# Patient Record
Sex: FEMALE | Race: BLACK OR AFRICAN AMERICAN | Employment: UNEMPLOYED | URBAN - METROPOLITAN AREA
[De-identification: names, ages, dates, MRNs, and addresses within clinical notes are randomized per-mention and may not be internally consistent; named-entity substitution may affect disease eponyms.]

---

## 2017-01-25 DIAGNOSIS — N20.1 BILATERAL URETERAL CALCULI: Primary | ICD-10-CM

## 2017-03-09 ENCOUNTER — HOSPITAL ENCOUNTER (OUTPATIENT)
Age: 32
Discharge: HOME | End: 2017-03-09
Payer: MEDICAID

## 2017-03-09 ENCOUNTER — HOSPITAL ENCOUNTER (OUTPATIENT)
Dept: GENERAL RADIOLOGY | Age: 32
Discharge: HOME | End: 2017-03-09
Payer: MEDICAID

## 2017-03-09 DIAGNOSIS — N20.1 BILATERAL URETERAL CALCULI: ICD-10-CM

## 2017-03-09 PROCEDURE — 74000 XR ABDOMEN LIMITED (KUB): CPT

## 2017-03-22 ENCOUNTER — TELEPHONE (OUTPATIENT)
Dept: UROLOGY | Age: 32
End: 2017-03-22

## 2017-03-23 ENCOUNTER — OFFICE VISIT (OUTPATIENT)
Dept: UROLOGY | Age: 32
End: 2017-03-23
Payer: MEDICAID

## 2017-03-23 VITALS
SYSTOLIC BLOOD PRESSURE: 135 MMHG | DIASTOLIC BLOOD PRESSURE: 96 MMHG | WEIGHT: 170 LBS | HEART RATE: 89 BPM | BODY MASS INDEX: 31.28 KG/M2 | HEIGHT: 62 IN

## 2017-03-23 DIAGNOSIS — N20.0 KIDNEY STONE ON LEFT SIDE: ICD-10-CM

## 2017-03-23 DIAGNOSIS — N39.0 URINARY TRACT INFECTION WITHOUT HEMATURIA, SITE UNSPECIFIED: ICD-10-CM

## 2017-03-23 DIAGNOSIS — N39.46 MIXED INCONTINENCE: Primary | ICD-10-CM

## 2017-03-23 LAB
BILIRUBIN, POC: NORMAL
BLOOD URINE, POC: NORMAL
CLARITY, POC: NORMAL
COLOR, POC: YELLOW
GLUCOSE URINE, POC: NORMAL
KETONES, POC: NORMAL
LEUKOCYTE EST, POC: NORMAL
NITRITE, POC: NORMAL
PH, POC: 7.5
POST VOID RESIDUAL (PVR): 156 ML
PROTEIN, POC: NORMAL
SPECIFIC GRAVITY, POC: 1.01
UROBILINOGEN, POC: 0.2

## 2017-03-23 PROCEDURE — 51798 US URINE CAPACITY MEASURE: CPT | Performed by: SPECIALIST

## 2017-03-23 PROCEDURE — 99214 OFFICE O/P EST MOD 30 MIN: CPT | Performed by: SPECIALIST

## 2017-03-23 PROCEDURE — 81002 URINALYSIS NONAUTO W/O SCOPE: CPT | Performed by: SPECIALIST

## 2017-03-23 RX ORDER — OXYBUTYNIN CHLORIDE 5 MG/1
5 TABLET, EXTENDED RELEASE ORAL DAILY
Qty: 30 TABLET | Refills: 3 | Status: SHIPPED | OUTPATIENT
Start: 2017-03-23 | End: 2017-08-03 | Stop reason: ALTCHOICE

## 2017-03-23 RX ORDER — NITROFURANTOIN 25; 75 MG/1; MG/1
100 CAPSULE ORAL 2 TIMES DAILY
Qty: 14 CAPSULE | Refills: 0 | Status: SHIPPED | OUTPATIENT
Start: 2017-03-23 | End: 2017-03-30

## 2017-03-29 ENCOUNTER — TELEPHONE (OUTPATIENT)
Dept: UROLOGY | Age: 32
End: 2017-03-29

## 2017-08-03 ENCOUNTER — OFFICE VISIT (OUTPATIENT)
Dept: UROLOGY | Age: 32
End: 2017-08-03
Payer: MEDICAID

## 2017-08-03 VITALS
WEIGHT: 167 LBS | DIASTOLIC BLOOD PRESSURE: 99 MMHG | HEART RATE: 76 BPM | SYSTOLIC BLOOD PRESSURE: 140 MMHG | HEIGHT: 62 IN | BODY MASS INDEX: 30.73 KG/M2

## 2017-08-03 DIAGNOSIS — N20.0 KIDNEY STONE ON LEFT SIDE: ICD-10-CM

## 2017-08-03 DIAGNOSIS — N39.46 MIXED INCONTINENCE: Primary | ICD-10-CM

## 2017-08-03 LAB
BILIRUBIN, POC: NEGATIVE
BLOOD URINE, POC: NORMAL
CLARITY, POC: NORMAL
COLOR, POC: YELLOW
GLUCOSE URINE, POC: NEGATIVE
KETONES, POC: NEGATIVE
LEUKOCYTE EST, POC: NORMAL
NITRITE, POC: NEGATIVE
PH, POC: 6
PROTEIN, POC: NORMAL
SPECIFIC GRAVITY, POC: 1.01
UROBILINOGEN, POC: 0.2

## 2017-08-03 PROCEDURE — 99213 OFFICE O/P EST LOW 20 MIN: CPT | Performed by: SPECIALIST

## 2017-08-03 PROCEDURE — 81003 URINALYSIS AUTO W/O SCOPE: CPT | Performed by: SPECIALIST

## 2017-08-03 RX ORDER — OXYBUTYNIN CHLORIDE 10 MG/1
10 TABLET, EXTENDED RELEASE ORAL DAILY
Qty: 90 TABLET | Refills: 3 | Status: SHIPPED | OUTPATIENT
Start: 2017-08-03 | End: 2018-09-27 | Stop reason: SDUPTHER

## 2018-03-22 ENCOUNTER — OFFICE VISIT (OUTPATIENT)
Dept: UROLOGY | Age: 33
End: 2018-03-22
Payer: MEDICARE

## 2018-03-22 VITALS
HEIGHT: 60 IN | WEIGHT: 174 LBS | SYSTOLIC BLOOD PRESSURE: 129 MMHG | BODY MASS INDEX: 34.16 KG/M2 | TEMPERATURE: 98 F | HEART RATE: 91 BPM | DIASTOLIC BLOOD PRESSURE: 84 MMHG

## 2018-03-22 DIAGNOSIS — N39.3 STRESS INCONTINENCE: ICD-10-CM

## 2018-03-22 DIAGNOSIS — N20.0 KIDNEY STONE ON LEFT SIDE: ICD-10-CM

## 2018-03-22 DIAGNOSIS — R35.0 FREQUENCY OF URINATION: Primary | ICD-10-CM

## 2018-03-22 LAB
APPEARANCE FLUID: ABNORMAL
BILIRUBIN, POC: ABNORMAL
BLOOD URINE, POC: ABNORMAL
CLARITY, POC: CLEAR
COLOR, POC: YELLOW
GLUCOSE URINE, POC: ABNORMAL
KETONES, POC: ABNORMAL
LEUKOCYTE EST, POC: ABNORMAL
NITRITE, POC: ABNORMAL
PH, POC: 7.5
PROTEIN, POC: ABNORMAL
SPECIFIC GRAVITY, POC: 1.02
UROBILINOGEN, POC: 0.2

## 2018-03-22 PROCEDURE — 99213 OFFICE O/P EST LOW 20 MIN: CPT | Performed by: SPECIALIST

## 2018-03-22 PROCEDURE — 1036F TOBACCO NON-USER: CPT | Performed by: SPECIALIST

## 2018-03-22 PROCEDURE — G8417 CALC BMI ABV UP PARAM F/U: HCPCS | Performed by: SPECIALIST

## 2018-03-22 PROCEDURE — G8484 FLU IMMUNIZE NO ADMIN: HCPCS | Performed by: SPECIALIST

## 2018-03-22 PROCEDURE — G8428 CUR MEDS NOT DOCUMENT: HCPCS | Performed by: SPECIALIST

## 2018-03-22 NOTE — PROGRESS NOTES
 Urinary leakage     Urinary urgency     Vitamin D deficiency      Past Surgical History:   Procedure Laterality Date    COLPOSCOPY  2003    at age 25 years s/p abnormal pap smear    CYSTOSCOPY  4/20/16    video urodynamics/cystoscopy under local anesthesia    CYSTOSCOPY  10/08/2016    WITH BILAT STENTS FOR KIDNEY STONES AT [facility]    CYSTOSCOPY  2004    WITH STONE BASKETING    CYSTOSCOPY Bilateral 10/13/2016    bilat retrogrades, bilat stent change, left stone basketing, right holmium laser    LITHOTRIPSY  2012    OTHER SURGICAL HISTORY Bilateral     sent removal; cysto    PELVIC LAPAROSCOPY  2014    with d and c, hysteroscopy    URETER STENT PLACEMENT  2007     Family History   Problem Relation Age of Onset    Heart Attack Paternal Aunt     Heart Attack Paternal Grandmother     High Blood Pressure Paternal Grandmother     Diabetes Mother     High Blood Pressure Mother     Heart Disease Father      CAD CARDIAC STENTS    High Blood Pressure Father     Asthma Maternal Grandmother     Diabetes Maternal Grandmother     Heart Disease Maternal Grandmother      MILD MI    High Blood Pressure Maternal Grandmother     Cancer Maternal Grandfather      LEUKEMIA    High Blood Pressure Maternal Grandfather     High Blood Pressure Paternal Grandfather      No outpatient prescriptions have been marked as taking for the 3/22/18 encounter (Office Visit) with Jeniffer Langford MD.       Shirlene [levofloxacin in d5w]  History   Smoking Status    Former Smoker    Quit date: 7/31/2005   Smokeless Tobacco    Not on file       History   Alcohol Use No       REVIEW OF SYSTEMS:  Constitutional: positive for  Increase appetite  Eyes: negative  Respiratory: negative  Cardiovascular: negative  Gastrointestinal: Normal  negative  Genitourinary: positive for frequency and dysuria  Musculoskeletal: negative  Skin: negative   Neurological: negative  Hematological/Lymphatic: negative  Psychological:

## 2018-07-31 ENCOUNTER — TELEPHONE (OUTPATIENT)
Dept: UROLOGY | Age: 33
End: 2018-07-31

## 2018-08-15 ENCOUNTER — TELEPHONE (OUTPATIENT)
Dept: UROLOGY | Age: 33
End: 2018-08-15

## 2018-08-15 NOTE — TELEPHONE ENCOUNTER
It does not need to be delayed. Up to patient if she just wants to get it done and out of the way. Let us know what she decides.

## 2018-08-22 ENCOUNTER — TELEPHONE (OUTPATIENT)
Dept: UROLOGY | Age: 33
End: 2018-08-22

## 2018-08-22 ENCOUNTER — HOSPITAL ENCOUNTER (EMERGENCY)
Age: 33
Discharge: HOME | End: 2018-08-23
Attending: EMERGENCY MEDICINE
Payer: MEDICARE

## 2018-08-22 DIAGNOSIS — R10.9 LEFT FLANK PAIN: ICD-10-CM

## 2018-08-22 DIAGNOSIS — R68.83 CHILLS: Primary | ICD-10-CM

## 2018-08-22 PROCEDURE — 99284 EMERGENCY DEPT VISIT MOD MDM: CPT

## 2018-08-23 VITALS
HEIGHT: 65 IN | OXYGEN SATURATION: 98 % | SYSTOLIC BLOOD PRESSURE: 147 MMHG | DIASTOLIC BLOOD PRESSURE: 92 MMHG | WEIGHT: 165 LBS | RESPIRATION RATE: 16 BRPM | TEMPERATURE: 98.2 F | BODY MASS INDEX: 27.49 KG/M2 | HEART RATE: 92 BPM

## 2018-08-23 LAB
-: NORMAL
ABSOLUTE EOS #: 0.06 K/UL (ref 0–0.44)
ABSOLUTE IMMATURE GRANULOCYTE: <0.03 K/UL (ref 0–0.3)
ABSOLUTE LYMPH #: 0.86 K/UL (ref 1.1–3.7)
ABSOLUTE MONO #: 0.56 K/UL (ref 0.1–1.2)
ALBUMIN SERPL-MCNC: 4.5 G/DL (ref 3.5–5.2)
ALBUMIN/GLOBULIN RATIO: 1.1 (ref 1–2.5)
ALP BLD-CCNC: 86 U/L (ref 35–104)
ALT SERPL-CCNC: 27 U/L (ref 5–33)
AMORPHOUS: NORMAL
ANION GAP SERPL CALCULATED.3IONS-SCNC: 11 MMOL/L (ref 9–17)
AST SERPL-CCNC: 23 U/L
BACTERIA: NORMAL
BASOPHILS # BLD: 0 % (ref 0–2)
BASOPHILS ABSOLUTE: <0.03 K/UL (ref 0–0.2)
BILIRUB SERPL-MCNC: 0.37 MG/DL (ref 0.3–1.2)
BILIRUBIN URINE: NEGATIVE
BUN BLDV-MCNC: 14 MG/DL (ref 6–20)
BUN/CREAT BLD: ABNORMAL (ref 9–20)
CALCIUM SERPL-MCNC: 9.4 MG/DL (ref 8.6–10.4)
CASTS UA: NORMAL /LPF (ref 0–8)
CHLORIDE BLD-SCNC: 97 MMOL/L (ref 98–107)
CO2: 22 MMOL/L (ref 20–31)
COLOR: YELLOW
COMMENT UA: ABNORMAL
CREAT SERPL-MCNC: 0.92 MG/DL (ref 0.5–0.9)
CRYSTALS, UA: NORMAL /HPF
DIFFERENTIAL TYPE: ABNORMAL
EOSINOPHILS RELATIVE PERCENT: 1 % (ref 1–4)
EPITHELIAL CELLS UA: NORMAL /HPF (ref 0–5)
GFR AFRICAN AMERICAN: >60 ML/MIN
GFR NON-AFRICAN AMERICAN: >60 ML/MIN
GFR SERPL CREATININE-BSD FRML MDRD: ABNORMAL ML/MIN/{1.73_M2}
GFR SERPL CREATININE-BSD FRML MDRD: ABNORMAL ML/MIN/{1.73_M2}
GLUCOSE BLD-MCNC: 92 MG/DL (ref 70–99)
GLUCOSE URINE: NEGATIVE
HCT VFR BLD CALC: 40.2 % (ref 36.3–47.1)
HEMOGLOBIN: 12.7 G/DL (ref 11.9–15.1)
IMMATURE GRANULOCYTES: 0 %
INR BLD: 1
KETONES, URINE: NEGATIVE
LACTIC ACID, WHOLE BLOOD: 1.4 MMOL/L (ref 0.7–2.1)
LEUKOCYTE ESTERASE, URINE: ABNORMAL
LYMPHOCYTES # BLD: 11 % (ref 24–43)
MCH RBC QN AUTO: 27.4 PG (ref 25.2–33.5)
MCHC RBC AUTO-ENTMCNC: 31.6 G/DL (ref 28.4–34.8)
MCV RBC AUTO: 86.6 FL (ref 82.6–102.9)
MONOCYTES # BLD: 7 % (ref 3–12)
MUCUS: NORMAL
NITRITE, URINE: NEGATIVE
NRBC AUTOMATED: 0 PER 100 WBC
OTHER OBSERVATIONS UA: NORMAL
PDW BLD-RTO: 14.6 % (ref 11.8–14.4)
PH UA: 6 (ref 5–8)
PLATELET # BLD: 269 K/UL (ref 138–453)
PLATELET ESTIMATE: ABNORMAL
PMV BLD AUTO: 9 FL (ref 8.1–13.5)
POTASSIUM SERPL-SCNC: 3.7 MMOL/L (ref 3.7–5.3)
PROTEIN UA: ABNORMAL
PROTHROMBIN TIME: 10.8 SEC (ref 9–12)
RBC # BLD: 4.64 M/UL (ref 3.95–5.11)
RBC # BLD: ABNORMAL 10*6/UL
RBC UA: NORMAL /HPF (ref 0–4)
RENAL EPITHELIAL, UA: NORMAL /HPF
SEG NEUTROPHILS: 81 % (ref 36–65)
SEGMENTED NEUTROPHILS ABSOLUTE COUNT: 6.38 K/UL (ref 1.5–8.1)
SODIUM BLD-SCNC: 130 MMOL/L (ref 135–144)
SPECIFIC GRAVITY UA: 1.01 (ref 1–1.03)
TOTAL PROTEIN: 8.7 G/DL (ref 6.4–8.3)
TRICHOMONAS: NORMAL
TURBIDITY: ABNORMAL
URINE HGB: ABNORMAL
UROBILINOGEN, URINE: NORMAL
WBC # BLD: 7.9 K/UL (ref 3.5–11.3)
WBC # BLD: ABNORMAL 10*3/UL
WBC UA: NORMAL /HPF (ref 0–5)
YEAST: NORMAL

## 2018-08-23 PROCEDURE — 85025 COMPLETE CBC W/AUTO DIFF WBC: CPT

## 2018-08-23 PROCEDURE — 85610 PROTHROMBIN TIME: CPT

## 2018-08-23 PROCEDURE — 6360000002 HC RX W HCPCS: Performed by: EMERGENCY MEDICINE

## 2018-08-23 PROCEDURE — 96374 THER/PROPH/DIAG INJ IV PUSH: CPT

## 2018-08-23 PROCEDURE — 87040 BLOOD CULTURE FOR BACTERIA: CPT

## 2018-08-23 PROCEDURE — 83605 ASSAY OF LACTIC ACID: CPT

## 2018-08-23 PROCEDURE — 81001 URINALYSIS AUTO W/SCOPE: CPT

## 2018-08-23 PROCEDURE — 80053 COMPREHEN METABOLIC PANEL: CPT

## 2018-08-23 PROCEDURE — 2580000003 HC RX 258: Performed by: EMERGENCY MEDICINE

## 2018-08-23 PROCEDURE — 87086 URINE CULTURE/COLONY COUNT: CPT

## 2018-08-23 RX ORDER — FENTANYL CITRATE 50 UG/ML
50 INJECTION, SOLUTION INTRAMUSCULAR; INTRAVENOUS ONCE
Status: COMPLETED | OUTPATIENT
Start: 2018-08-23 | End: 2018-08-23

## 2018-08-23 RX ORDER — 0.9 % SODIUM CHLORIDE 0.9 %
1000 INTRAVENOUS SOLUTION INTRAVENOUS ONCE
Status: COMPLETED | OUTPATIENT
Start: 2018-08-23 | End: 2018-08-23

## 2018-08-23 RX ADMIN — SODIUM CHLORIDE 1000 ML: 9 INJECTION, SOLUTION INTRAVENOUS at 00:43

## 2018-08-23 RX ADMIN — FENTANYL CITRATE 50 MCG: 50 INJECTION, SOLUTION INTRAMUSCULAR; INTRAVENOUS at 00:43

## 2018-08-23 ASSESSMENT — PAIN DESCRIPTION - PAIN TYPE: TYPE: ACUTE PAIN

## 2018-08-23 ASSESSMENT — PAIN DESCRIPTION - LOCATION: LOCATION: FLANK

## 2018-08-23 ASSESSMENT — PAIN SCALES - GENERAL
PAINLEVEL_OUTOF10: 5
PAINLEVEL_OUTOF10: 5

## 2018-08-23 ASSESSMENT — PAIN DESCRIPTION - ORIENTATION: ORIENTATION: LEFT

## 2018-08-23 NOTE — ED PROVIDER NOTES
[address] [facility]  Emergency Department  Faculty Attestation     I performed a history and physical examination of the patient and discussed management with the resident. I reviewed the residents note and agree with the documented findings and plan of care. Any areas of disagreement are noted on the chart. I was personally present for the key portions of any procedures. I have documented in the chart those procedures where I was not present during the key portions. I have reviewed the emergency nurses triage note. I agree with the chief complaint, past medical history, past surgical history, allergies, medications, social and family history as documented unless otherwise noted below. For Physician Assistant/ Nurse Practitioner cases/documentation I have personally evaluated this patient and have completed at least one if not all key elements of the E/M (history, physical exam, and MDM). Additional findings are as noted. Primary Care Physician:  Danette Taylor    Screenings:  [unfilled]    CHIEF COMPLAINT       Chief Complaint   Patient presents with    Chills       RECENT VITALS:   Temp: 98.2 °F (36.8 °C),  Pulse: 115, Resp: 16, BP: (!) 164/100    LABS:  Labs Reviewed   CULTURE BLOOD #1   CULTURE BLOOD #1   CBC WITH AUTO DIFFERENTIAL   COMPREHENSIVE METABOLIC PANEL   URINE RT REFLEX TO CULTURE   LACTIC ACID, WHOLE BLOOD   PROTIME-INR       Radiology  No orders to display       Attending Physician Additional  Notes    Patient has a history of UTIs, kidney stones, E. Coli sepsis. She states she feels the same way when she had E. Coli sepsis last month. She's been ill since this morning with chills fatigue fever difficulty speaking headache and left flank pain. Later she states that the visiting nurse saw her at her home at 4 PM and she had no fever and no symptoms at that time. She has bilateral renal stones. She's been having dysuria. There is nausea but no vomiting. There is no faintness. There is no stiff neck. No rash. She is not presently on antibiotics. She is scheduled for cystoscopy in the morning. Patient is 7 weeks  Post-partum For . She is also 2 weeks status post laparoscopic cholecystectomy. On exam she tachycardic but afebrile hypertensive other vital signs are normal.  GCS is 15. Her speech is slow but not slurred. This is not truly dysarthria. She has no aphasia. Normal pupils cranial nerves motor strength. Abdomen is slightly distended but soft and nontender. There is no left CVA tenderness. Reflexes are quite diminished, no clonus, no edema. I do not feel her headache and abdominal symptoms are from postpartum preeclampsia. Plan is septic workup including blood cultures urine labs lactate. Will give fluids Tylenol possibly migraine medications, consider CT brain due to headache and speech abnormality. Anticipate admission with IV fluids, antibiotics and urology consultation. [facility].  Jeff Driver MD, [address] [facility],[address]  Attending Emergency  Physician                Astrid Rodríguez MD  18 Lee Ann Hauser MD  [phone]

## 2018-08-23 NOTE — ED PROVIDER NOTES
Dr Aida Eldridge sign out, ureteral stent, chills 7 wk post partum, ?  Bacteremia, plan probable admit,      Yoan Santiago, DO  08/23/18 0136  Pt declines admission, risk discussed        Yoan Santiago, DO  08/23/18 [id number]

## 2018-08-23 NOTE — ED NOTES
Pt to ED with boyfriend with c/o of chills. Pt has been experincing pain in the L flank from a dx kidney stone. Pt also experiencing chills today and a fever today. Pt did not take her temperature at home, states that she \"just knows\" when she has a fever. Pt also states \"I'm septic\". Pt is scheduled to have a cysto in the morning. Pt is speaking in one to two word sentences with her voice shaking. Pt was unable to walk from [facility] to [facility] requiring a wheel chair with max assist to get to cot. Pt only wiling to lay on her RT side on the cot.       Milvia Delgado RN  08/23/18 [id number]

## 2018-08-23 NOTE — ED PROVIDER NOTES
chewable tablet Take 80 mg by mouth as needed 7/10/18   Historical Provider, MD   docusate sodium (COLACE) 100 MG capsule Take 100 mg by mouth daily as needed     Historical Provider, MD       REVIEW OF SYSTEMS    (2-9 systems for level 4, 10 or more for level 5)      Constitutional ROS - + recent fevers, + recent chills  Neurological ROS - No Headache, No Syncope  Opthalmologic ROS- No eye pain, No vision changes   ENT ROS - No sore throat, No congestion  Respiratory ROS - No cough, No shortness of breath  Cardiovascular ROS - No chest pain, No palpitations   Gastrointestinal ROS -  + cva tenderness, + nausea, No vomiting  Genito-Urinary ROS - No dysuria, No hematuria  Musculoskeletal ROS - No back pain, No neck pain  Dermatological ROS - No wound, No rash      PHYSICAL EXAM   (up to 7 for level 4, 8 or more for level 5)      INITIAL VITALS:   BP (!) 147/92   Pulse 92   Temp 98.2 °F (36.8 °C) (Oral)   Resp 16   Ht 5' 5\" (1.651 m)   Wt 165 lb (74.8 kg)   SpO2 98%   BMI 27.46 kg/m²     CONSTITUTIONAL: AOx4, no apparent distress, appears stated age   HEAD: normocephalic, atraumatic   EYES: PERRL, EOMI    ENT: moist mucous membranes, uvula midline   NECK: supple, symmetric   BACK: symmetric   LUNGS: clear to auscultation bilaterally   CARDIOVASCULAR: Tachycardic rate and rhythm, no murmurs, rubs or gallops   ABDOMEN: soft, + mild CVA tenderness, non-distended   : deferred     NEUROLOGIC:  MAEx4, no focal sensory or motor deficits   MUSCULOSKELETAL: no clubbing, cyanosis or edema   SKIN: no exposed rash     DIFFERENTIAL  DIAGNOSIS     PLAN (LABS / IMAGING / EKG):  Orders Placed This Encounter   Procedures    Culture Blood #1    Culture Blood #1    Urine Culture    CBC WITH AUTO DIFFERENTIAL    Comprehensive Metabolic Panel    Urinalysis Reflex to Culture    Lactic Acid, Whole Blood    Protime-INR    Microscopic Urinalysis    Inpatient consult to Urology       MEDICATIONS ORDERED:  Orders Placed This Encounter   Medications    fentaNYL (SUBLIMAZE) injection 50 mcg    0.9 % sodium chloride bolus       DDX: Sepsis, pyelonephritis, kidney stone,    DIAGNOSTIC RESULTS / EMERGENCY DEPARTMENT COURSE / MDM     LABS:  Results for orders placed or performed during the hospital encounter of 08/22/18   CBC WITH AUTO DIFFERENTIAL   Result Value Ref Range    WBC 7.9 3.5 - 11.3 k/uL    RBC 4.64 3.95 - 5.11 m/uL    Hemoglobin 12.7 11.9 - 15.1 g/dL    Hematocrit 40.2 36.3 - 47.1 %    MCV 86.6 82.6 - 102.9 fL    MCH 27.4 25.2 - 33.5 pg    MCHC 31.6 28.4 - 34.8 g/dL    RDW 14.6 (H) 11.8 - 14.4 %    Platelets 490 577 - 341 k/uL    MPV 9.0 8.1 - 13.5 fL    NRBC Automated 0.0 0.0 per 100 WBC    Differential Type NOT REPORTED     Seg Neutrophils 81 (H) 36 - 65 %    Lymphocytes 11 (L) 24 - 43 %    Monocytes 7 3 - 12 %    Eosinophils % 1 1 - 4 %    Basophils 0 0 - 2 %    Immature Granulocytes 0 0 %    Segs Absolute 6.38 1.50 - 8.10 k/uL    Absolute Lymph # 0.86 (L) 1.10 - 3.70 k/uL    Absolute Mono # 0.56 0.10 - 1.20 k/uL    Absolute Eos # 0.06 0.00 - 0.44 k/uL    Basophils # <0.03 0.00 - 0.20 k/uL    Absolute Immature Granulocyte <0.03 0.00 - 0.30 k/uL    WBC Morphology NOT REPORTED     RBC Morphology ANISOCYTOSIS PRESENT     Platelet Estimate NOT REPORTED    Comprehensive Metabolic Panel   Result Value Ref Range    Glucose 92 70 - 99 mg/dL    BUN 14 6 - 20 mg/dL    CREATININE 0.92 (H) 0.50 - 0.90 mg/dL    Bun/Cre Ratio NOT REPORTED 9 - 20    Calcium 9.4 8.6 - 10.4 mg/dL    Sodium 130 (L) 135 - 144 mmol/L    Potassium 3.7 3.7 - 5.3 mmol/L    Chloride 97 (L) 98 - 107 mmol/L    CO2 22 20 - 31 mmol/L    Anion Gap 11 9 - 17 mmol/L    Alkaline Phosphatase 86 35 - 104 U/L    ALT 27 5 - 33 U/L    AST 23 <32 U/L    Total Bilirubin 0.37 0.3 - 1.2 mg/dL    Total Protein 8.7 (H) 6.4 - 8.3 g/dL    Alb 4.5 3.5 - 5.2 g/dL    Albumin/Globulin Ratio 1.1 1.0 - 2.5    GFR Non-African American >60 >60 mL/min    GFR African American >60 >60 mL/min GFR Comment          GFR Staging NOT REPORTED    Urinalysis Reflex to Culture   Result Value Ref Range    Color, UA YELLOW YEL    Turbidity UA CLOUDY (A) CLEAR    Glucose, Ur NEGATIVE NEG    Bilirubin Urine NEGATIVE NEG    Ketones, Urine NEGATIVE NEG    Specific Gravity, UA 1.007 1.005 - 1.030    Urine Hgb LARGE (A) NEG    pH, UA 6.0 5.0 - 8.0    Protein, UA 2+ (A) NEG    Urobilinogen, Urine Normal NORM    Nitrite, Urine NEGATIVE NEG    Leukocyte Esterase, Urine LARGE (A) NEG    Urinalysis Comments NOT REPORTED    Lactic Acid, Whole Blood   Result Value Ref Range    Lactic Acid, Whole Blood 1.4 0.7 - 2.1 mmol/L   Protime-INR   Result Value Ref Range    Protime 10.8 9.0 - 12.0 sec    INR 1.0    Microscopic Urinalysis   Result Value Ref Range    -          WBC, UA TOO NUMEROUS TO COUNT 0 - 5 /HPF    RBC, UA 10 TO 20 0 - 4 /HPF    Casts UA NOT REPORTED 0 - 8 /LPF    Crystals UA NOT REPORTED NONE /HPF    Epithelial Cells UA 0 TO 2 0 - 5 /HPF    Renal Epithelial, Urine NOT REPORTED 0 /HPF    Bacteria, UA NOT REPORTED NONE    Mucus, UA NOT REPORTED NONE    Trichomonas, UA NOT REPORTED NONE    Amorphous, UA NOT REPORTED NONE    Other Observations UA NOT REPORTED NREQ    Yeast, UA NOT REPORTED NONE       IMPRESSION: Left flank pain, hematuria, known stent    RADIOLOGY:  No results found. EKG      All EKG's are interpreted by the Emergency Department Physician who either signs or Co-signs this chart in the absence of a cardiologist.    EMERGENCY DEPARTMENT COURSE:  35 y.o. female with left flank pain as well as irritation from her urostomy stent on the left side. Patient has known stones and has planned urology procedure today at 7 AM.  Patient concerned that she may have sepsis as she is feeling fatigued as well as febrile and chills. Patient denies taking any medications to treat her symptoms and upon arrival she does not have a fever but is mildly tachycardic.   The tachycardia is likely due to the pain as it resolved after fluids and analgesics. Lactic acid as well as white blood cell count are normal.  Urine shows large leuks and blood likely from stent. When results came back I paged urology who were comfortable with the patient being sent up to her procedure preop area 6 AM to reevaluate her as it was already 4 AM.    I spoke to the patient about the plan to wait until 6 AM and sent her up to preop area and patient states she feels too fatigued and she does not wish to have the procedure done at this time and that she will reschedule her later today. Patient's pain has resolved. I informed patient that she should speak with her urologist about this decision. Okay to be discharged home. PLAN/MEDS:  Orders Placed This Encounter   Procedures    Culture Blood #1    Culture Blood #1    Urine Culture    CBC WITH AUTO DIFFERENTIAL    Comprehensive Metabolic Panel    Urinalysis Reflex to Culture    Lactic Acid, Whole Blood    Protime-INR    Microscopic Urinalysis    Inpatient consult to Urology       Orders Placed This Encounter   Medications    fentaNYL (SUBLIMAZE) injection 50 mcg    0.9 % sodium chloride bolus       PROCEDURES:  None    CONSULTS:  IP CONSULT TO UROLOGY    CRITICAL CARE:  None    FINAL IMPRESSION      1. Chills    2. Left flank pain          DISPOSITION / PLAN     DISPOSITION Decision To Discharge 08/23/2018 03:57:56 AM      PATIENT REFERRED TO:  Jericho NICOLE/Giovanny Perezsabrina  [address] [address]  [phone]    Schedule an appointment as soon as possible for a visit         DISCHARGE MEDICATIONS:  Discharge Medication List as of 8/23/2018  3:58 AM          Natalia Chávez DO  Emergency Medicine Resident    Pre-hypertension/Hypertension: You have been informed that you may have pre-hypertension or Hypertension based on a blood pressure reading in the Emergency Department.  I recommend you call the primary care provider listed on your discharge instructions or a physician of your choice this week to arrange follow up for further evaluation of possible pre-hypertension or Hypertension. (Please note that portions of this note were completed with a voice recognition program.  Efforts were made to edit the dictations but occasionally words are mis-transcribed.  Whenever words are used in this note in any gender, they shall be construed as though they were used in the gender appropriate to the circumstances; and whenever words are used in this note in the singular or plural form, they shall be construed as though they were used in the form appropriate to the circumstances.)       Arie Azar, DO  Resident  08/23/18 1502

## 2018-08-24 LAB
CULTURE: NO GROWTH
Lab: NORMAL
SPECIMEN DESCRIPTION: NORMAL
STATUS: NORMAL

## 2018-08-27 ENCOUNTER — TELEPHONE (OUTPATIENT)
Dept: UROLOGY | Age: 33
End: 2018-08-27

## 2018-08-27 NOTE — TELEPHONE ENCOUNTER
Pt called writer and had lots of questions about surgery CYSTOSCOPY, INSERTION OCCLUSIVE BALLOON - PT TO INTERV RADIOLOGY POST OP HOLMIUM - NEPHROLITHOTOMY PERCUTANEOUS, C-ARM, LITHOCLAST   PT COMING FROM INTERV RADIOLOGY on 9/7/18. She is saying that she doesn't want the piece that hangs out of her to be there this time i'm not sure what pt is referring to so if you could please advise.

## 2018-08-29 LAB
CULTURE: NORMAL
Lab: NORMAL
SPECIMEN DESCRIPTION: NORMAL
STATUS: NORMAL

## 2018-09-06 ENCOUNTER — ANESTHESIA EVENT (OUTPATIENT)
Dept: OPERATING ROOM | Age: 33
DRG: 443 | End: 2018-09-06
Payer: MEDICARE

## 2018-09-06 RX ORDER — CLINDAMYCIN PHOSPHATE 600 MG/50ML
600 INJECTION INTRAVENOUS ONCE
Status: CANCELLED | OUTPATIENT
Start: 2018-09-06 | End: 2018-09-06

## 2018-09-07 ENCOUNTER — HOSPITAL ENCOUNTER (OUTPATIENT)
Dept: INTERVENTIONAL RADIOLOGY/VASCULAR | Age: 33
Discharge: HOME | DRG: 443 | End: 2018-09-09
Payer: MEDICARE

## 2018-09-07 ENCOUNTER — ANESTHESIA (OUTPATIENT)
Dept: OPERATING ROOM | Age: 33
DRG: 443 | End: 2018-09-07
Payer: MEDICARE

## 2018-09-07 ENCOUNTER — APPOINTMENT (OUTPATIENT)
Dept: GENERAL RADIOLOGY | Age: 33
DRG: 443 | End: 2018-09-07
Attending: SPECIALIST
Payer: MEDICARE

## 2018-09-07 ENCOUNTER — HOSPITAL ENCOUNTER (INPATIENT)
Age: 33
LOS: 1 days | Discharge: HOME | DRG: 443 | End: 2018-09-08
Attending: SPECIALIST | Admitting: SPECIALIST
Payer: MEDICARE

## 2018-09-07 ENCOUNTER — HOSPITAL ENCOUNTER (OUTPATIENT)
Dept: GENERAL RADIOLOGY | Age: 33
Discharge: HOME | DRG: 443 | End: 2018-09-09
Payer: MEDICARE

## 2018-09-07 ENCOUNTER — ANESTHESIA (OUTPATIENT)
Dept: INTERVENTIONAL RADIOLOGY/VASCULAR | Age: 33
DRG: 443 | End: 2018-09-07
Payer: MEDICARE

## 2018-09-07 ENCOUNTER — ANESTHESIA EVENT (OUTPATIENT)
Dept: INTERVENTIONAL RADIOLOGY/VASCULAR | Age: 33
DRG: 443 | End: 2018-09-07
Payer: MEDICARE

## 2018-09-07 ENCOUNTER — ANESTHESIA EVENT (OUTPATIENT)
Dept: OPERATING ROOM | Age: 33
DRG: 443 | End: 2018-09-07
Payer: MEDICARE

## 2018-09-07 VITALS
OXYGEN SATURATION: 100 % | SYSTOLIC BLOOD PRESSURE: 154 MMHG | RESPIRATION RATE: 21 BRPM | DIASTOLIC BLOOD PRESSURE: 92 MMHG

## 2018-09-07 VITALS
TEMPERATURE: 92.9 F | RESPIRATION RATE: 12 BRPM | DIASTOLIC BLOOD PRESSURE: 90 MMHG | OXYGEN SATURATION: 100 % | SYSTOLIC BLOOD PRESSURE: 178 MMHG

## 2018-09-07 VITALS — SYSTOLIC BLOOD PRESSURE: 182 MMHG | DIASTOLIC BLOOD PRESSURE: 109 MMHG | OXYGEN SATURATION: 100 % | TEMPERATURE: 95.4 F

## 2018-09-07 DIAGNOSIS — N20.0 RENAL CALCULUS, LEFT: Primary | ICD-10-CM

## 2018-09-07 DIAGNOSIS — N20.0 KIDNEY STONE: ICD-10-CM

## 2018-09-07 DIAGNOSIS — N20.0 RENAL CALCULUS: ICD-10-CM

## 2018-09-07 LAB
ANION GAP SERPL CALCULATED.3IONS-SCNC: 13 MMOL/L (ref 9–17)
BUN BLDV-MCNC: 7 MG/DL (ref 6–20)
BUN/CREAT BLD: ABNORMAL (ref 9–20)
CALCIUM SERPL-MCNC: 8.8 MG/DL (ref 8.6–10.4)
CHLORIDE BLD-SCNC: 104 MMOL/L (ref 98–107)
CO2: 25 MMOL/L (ref 20–31)
CREAT SERPL-MCNC: 0.74 MG/DL (ref 0.5–0.9)
EKG ATRIAL RATE: 77 BPM
EKG P AXIS: 13 DEGREES
EKG P-R INTERVAL: 142 MS
EKG Q-T INTERVAL: 394 MS
EKG QRS DURATION: 90 MS
EKG QTC CALCULATION (BAZETT): 445 MS
EKG R AXIS: 13 DEGREES
EKG T AXIS: 25 DEGREES
EKG VENTRICULAR RATE: 77 BPM
GFR AFRICAN AMERICAN: >60 ML/MIN
GFR NON-AFRICAN AMERICAN: >60 ML/MIN
GFR NON-AFRICAN AMERICAN: >60 ML/MIN
GFR SERPL CREATININE-BSD FRML MDRD: >60 ML/MIN
GFR SERPL CREATININE-BSD FRML MDRD: ABNORMAL ML/MIN/{1.73_M2}
GFR SERPL CREATININE-BSD FRML MDRD: ABNORMAL ML/MIN/{1.73_M2}
GFR SERPL CREATININE-BSD FRML MDRD: NORMAL ML/MIN/{1.73_M2}
GLUCOSE BLD-MCNC: 110 MG/DL (ref 70–99)
GLUCOSE BLD-MCNC: 83 MG/DL (ref 74–100)
HCG, PREGNANCY URINE (POC): NEGATIVE
HCT VFR BLD CALC: 31.4 % (ref 36.3–47.1)
HCT VFR BLD CALC: 36.3 % (ref 36.3–47.1)
HEMOGLOBIN: 11.3 G/DL (ref 11.9–15.1)
HEMOGLOBIN: 9.9 G/DL (ref 11.9–15.1)
INR BLD: 1
MCH RBC QN AUTO: 26.5 PG (ref 25.2–33.5)
MCH RBC QN AUTO: 26.8 PG (ref 25.2–33.5)
MCHC RBC AUTO-ENTMCNC: 31.1 G/DL (ref 28.4–34.8)
MCHC RBC AUTO-ENTMCNC: 31.5 G/DL (ref 28.4–34.8)
MCV RBC AUTO: 85 FL (ref 82.6–102.9)
MCV RBC AUTO: 85.1 FL (ref 82.6–102.9)
NRBC AUTOMATED: 0 PER 100 WBC
NRBC AUTOMATED: 0 PER 100 WBC
PARTIAL THROMBOPLASTIN TIME: 21.5 SEC (ref 20.5–30.5)
PDW BLD-RTO: 16 % (ref 11.8–14.4)
PDW BLD-RTO: 16 % (ref 11.8–14.4)
PLATELET # BLD: 390 K/UL (ref 138–453)
PLATELET # BLD: 427 K/UL (ref 138–453)
PMV BLD AUTO: 8.7 FL (ref 8.1–13.5)
PMV BLD AUTO: 8.9 FL (ref 8.1–13.5)
POC CREATININE: 1 MG/DL (ref 0.51–1.19)
POTASSIUM SERPL-SCNC: 4.4 MMOL/L (ref 3.7–5.3)
PROTHROMBIN TIME: 10.4 SEC (ref 9–12)
RBC # BLD: 3.69 M/UL (ref 3.95–5.11)
RBC # BLD: 4.27 M/UL (ref 3.95–5.11)
SODIUM BLD-SCNC: 142 MMOL/L (ref 135–144)
WBC # BLD: 19.5 K/UL (ref 3.5–11.3)
WBC # BLD: 7.1 K/UL (ref 3.5–11.3)

## 2018-09-07 PROCEDURE — 2709999900 HC NON-CHARGEABLE SUPPLY: Performed by: SPECIALIST

## 2018-09-07 PROCEDURE — 50433 PLMT NEPHROURETERAL CATHETER: CPT | Performed by: RADIOLOGY

## 2018-09-07 PROCEDURE — 6360000002 HC RX W HCPCS: Performed by: NURSE ANESTHETIST, CERTIFIED REGISTERED

## 2018-09-07 PROCEDURE — 2580000003 HC RX 258: Performed by: NURSE ANESTHETIST, CERTIFIED REGISTERED

## 2018-09-07 PROCEDURE — 1200000000 HC SEMI PRIVATE

## 2018-09-07 PROCEDURE — 3600000004 HC SURGERY LEVEL 4 BASE: Performed by: SPECIALIST

## 2018-09-07 PROCEDURE — 84703 CHORIONIC GONADOTROPIN ASSAY: CPT

## 2018-09-07 PROCEDURE — 93005 ELECTROCARDIOGRAM TRACING: CPT

## 2018-09-07 PROCEDURE — 2580000003 HC RX 258: Performed by: ANESTHESIOLOGY

## 2018-09-07 PROCEDURE — C1725 CATH, TRANSLUMIN NON-LASER: HCPCS

## 2018-09-07 PROCEDURE — 2500000003 HC RX 250 WO HCPCS: Performed by: NURSE ANESTHETIST, CERTIFIED REGISTERED

## 2018-09-07 PROCEDURE — 2709999900 HC NON-CHARGEABLE SUPPLY

## 2018-09-07 PROCEDURE — 3700000001 HC ADD 15 MINUTES (ANESTHESIA): Performed by: SPECIALIST

## 2018-09-07 PROCEDURE — C1894 INTRO/SHEATH, NON-LASER: HCPCS | Performed by: SPECIALIST

## 2018-09-07 PROCEDURE — C1894 INTRO/SHEATH, NON-LASER: HCPCS

## 2018-09-07 PROCEDURE — C2628 CATHETER, OCCLUSION: HCPCS | Performed by: SPECIALIST

## 2018-09-07 PROCEDURE — 74018 RADEX ABDOMEN 1 VIEW: CPT

## 2018-09-07 PROCEDURE — 7100000000 HC PACU RECOVERY - FIRST 15 MIN: Performed by: SPECIALIST

## 2018-09-07 PROCEDURE — 6370000000 HC RX 637 (ALT 250 FOR IP): Performed by: UROLOGY

## 2018-09-07 PROCEDURE — 2580000003 HC RX 258: Performed by: UROLOGY

## 2018-09-07 PROCEDURE — 85610 PROTHROMBIN TIME: CPT

## 2018-09-07 PROCEDURE — BT121ZZ FLUOROSCOPY OF LEFT KIDNEY USING LOW OSMOLAR CONTRAST: ICD-10-PCS | Performed by: SPECIALIST

## 2018-09-07 PROCEDURE — 0TC13ZZ EXTIRPATION OF MATTER FROM LEFT KIDNEY, PERCUTANEOUS APPROACH: ICD-10-PCS | Performed by: SPECIALIST

## 2018-09-07 PROCEDURE — C1769 GUIDE WIRE: HCPCS

## 2018-09-07 PROCEDURE — 80048 BASIC METABOLIC PNL TOTAL CA: CPT

## 2018-09-07 PROCEDURE — C1726 CATH, BAL DIL, NON-VASCULAR: HCPCS | Performed by: SPECIALIST

## 2018-09-07 PROCEDURE — C1773 RET DEV, INSERTABLE: HCPCS | Performed by: SPECIALIST

## 2018-09-07 PROCEDURE — 3700000000 HC ANESTHESIA ATTENDED CARE

## 2018-09-07 PROCEDURE — BT1F1ZZ FLUOROSCOPY OF LEFT KIDNEY, URETER AND BLADDER USING LOW OSMOLAR CONTRAST: ICD-10-PCS | Performed by: SPECIALIST

## 2018-09-07 PROCEDURE — 0TP98DZ REMOVAL OF INTRALUMINAL DEVICE FROM URETER, VIA NATURAL OR ARTIFICIAL OPENING ENDOSCOPIC: ICD-10-PCS | Performed by: SPECIALIST

## 2018-09-07 PROCEDURE — 7100000040 HC SPAR PHASE II RECOVERY - FIRST 15 MIN: Performed by: SPECIALIST

## 2018-09-07 PROCEDURE — G0378 HOSPITAL OBSERVATION PER HR: HCPCS

## 2018-09-07 PROCEDURE — 36415 COLL VENOUS BLD VENIPUNCTURE: CPT

## 2018-09-07 PROCEDURE — 3600000013 HC SURGERY LEVEL 3 ADDTL 15MIN: Performed by: SPECIALIST

## 2018-09-07 PROCEDURE — 7100000001 HC PACU RECOVERY - ADDTL 15 MIN: Performed by: SPECIALIST

## 2018-09-07 PROCEDURE — C1769 GUIDE WIRE: HCPCS | Performed by: SPECIALIST

## 2018-09-07 PROCEDURE — 85027 COMPLETE CBC AUTOMATED: CPT

## 2018-09-07 PROCEDURE — 6360000004 HC RX CONTRAST MEDICATION: Performed by: RADIOLOGY

## 2018-09-07 PROCEDURE — 3600000003 HC SURGERY LEVEL 3 BASE: Performed by: SPECIALIST

## 2018-09-07 PROCEDURE — 3700000001 HC ADD 15 MINUTES (ANESTHESIA)

## 2018-09-07 PROCEDURE — 7100000041 HC SPAR PHASE II RECOVERY - ADDTL 15 MIN: Performed by: SPECIALIST

## 2018-09-07 PROCEDURE — 0T9130Z DRAINAGE OF LEFT KIDNEY WITH DRAINAGE DEVICE, PERCUTANEOUS APPROACH: ICD-10-PCS | Performed by: SPECIALIST

## 2018-09-07 PROCEDURE — 82947 ASSAY GLUCOSE BLOOD QUANT: CPT

## 2018-09-07 PROCEDURE — 2720000010 HC SURG SUPPLY STERILE: Performed by: SPECIALIST

## 2018-09-07 PROCEDURE — 6360000002 HC RX W HCPCS: Performed by: ANESTHESIOLOGY

## 2018-09-07 PROCEDURE — 6360000004 HC RX CONTRAST MEDICATION: Performed by: SPECIALIST

## 2018-09-07 PROCEDURE — 3600000014 HC SURGERY LEVEL 4 ADDTL 15MIN: Performed by: SPECIALIST

## 2018-09-07 PROCEDURE — 85730 THROMBOPLASTIN TIME PARTIAL: CPT

## 2018-09-07 PROCEDURE — 3700000000 HC ANESTHESIA ATTENDED CARE: Performed by: SPECIALIST

## 2018-09-07 PROCEDURE — 82565 ASSAY OF CREATININE: CPT

## 2018-09-07 PROCEDURE — 2580000003 HC RX 258: Performed by: SPECIALIST

## 2018-09-07 PROCEDURE — 82365 CALCULUS SPECTROSCOPY: CPT

## 2018-09-07 RX ORDER — ONDANSETRON 2 MG/ML
INJECTION INTRAMUSCULAR; INTRAVENOUS PRN
Status: DISCONTINUED | OUTPATIENT
Start: 2018-09-07 | End: 2018-09-07 | Stop reason: SDUPTHER

## 2018-09-07 RX ORDER — TAMSULOSIN HYDROCHLORIDE 0.4 MG/1
0.4 CAPSULE ORAL DAILY
Status: DISCONTINUED | OUTPATIENT
Start: 2018-09-07 | End: 2018-09-08 | Stop reason: HOSPADM

## 2018-09-07 RX ORDER — PROPOFOL 10 MG/ML
INJECTION, EMULSION INTRAVENOUS PRN
Status: DISCONTINUED | OUTPATIENT
Start: 2018-09-07 | End: 2018-09-07 | Stop reason: SDUPTHER

## 2018-09-07 RX ORDER — MEPERIDINE HYDROCHLORIDE 50 MG/ML
12.5 INJECTION INTRAMUSCULAR; INTRAVENOUS; SUBCUTANEOUS EVERY 5 MIN PRN
Status: DISCONTINUED | OUTPATIENT
Start: 2018-09-07 | End: 2018-09-07 | Stop reason: HOSPADM

## 2018-09-07 RX ORDER — DIPHENHYDRAMINE HYDROCHLORIDE 50 MG/ML
12.5 INJECTION INTRAMUSCULAR; INTRAVENOUS
Status: COMPLETED | OUTPATIENT
Start: 2018-09-07 | End: 2018-09-07

## 2018-09-07 RX ORDER — DOCUSATE SODIUM 100 MG/1
100 CAPSULE, LIQUID FILLED ORAL 2 TIMES DAILY
Status: DISCONTINUED | OUTPATIENT
Start: 2018-09-07 | End: 2018-09-08 | Stop reason: HOSPADM

## 2018-09-07 RX ORDER — MIDAZOLAM HYDROCHLORIDE 1 MG/ML
INJECTION INTRAMUSCULAR; INTRAVENOUS PRN
Status: DISCONTINUED | OUTPATIENT
Start: 2018-09-07 | End: 2018-09-07 | Stop reason: SDUPTHER

## 2018-09-07 RX ORDER — ACETAMINOPHEN 325 MG/1
650 TABLET ORAL EVERY 4 HOURS PRN
Status: DISCONTINUED | OUTPATIENT
Start: 2018-09-07 | End: 2018-09-08 | Stop reason: HOSPADM

## 2018-09-07 RX ORDER — PROPOFOL 10 MG/ML
INJECTION, EMULSION INTRAVENOUS CONTINUOUS PRN
Status: DISCONTINUED | OUTPATIENT
Start: 2018-09-07 | End: 2018-09-07 | Stop reason: SDUPTHER

## 2018-09-07 RX ORDER — OXYCODONE HYDROCHLORIDE AND ACETAMINOPHEN 5; 325 MG/1; MG/1
1 TABLET ORAL EVERY 4 HOURS PRN
Status: DISCONTINUED | OUTPATIENT
Start: 2018-09-07 | End: 2018-09-08 | Stop reason: HOSPADM

## 2018-09-07 RX ORDER — CEFAZOLIN SODIUM 1 G/50ML
1 INJECTION, SOLUTION INTRAVENOUS EVERY 8 HOURS
Status: CANCELLED | OUTPATIENT
Start: 2018-09-07 | End: 2018-09-08

## 2018-09-07 RX ORDER — SODIUM CHLORIDE 9 MG/ML
INJECTION, SOLUTION INTRAVENOUS CONTINUOUS PRN
Status: DISCONTINUED | OUTPATIENT
Start: 2018-09-07 | End: 2018-09-07 | Stop reason: SDUPTHER

## 2018-09-07 RX ORDER — DEXAMETHASONE SODIUM PHOSPHATE 4 MG/ML
INJECTION, SOLUTION INTRA-ARTICULAR; INTRALESIONAL; INTRAMUSCULAR; INTRAVENOUS; SOFT TISSUE PRN
Status: DISCONTINUED | OUTPATIENT
Start: 2018-09-07 | End: 2018-09-07 | Stop reason: SDUPTHER

## 2018-09-07 RX ORDER — GLYCOPYRROLATE 1 MG/5 ML
SYRINGE (ML) INTRAVENOUS PRN
Status: DISCONTINUED | OUTPATIENT
Start: 2018-09-07 | End: 2018-09-07 | Stop reason: SDUPTHER

## 2018-09-07 RX ORDER — OXYCODONE HYDROCHLORIDE AND ACETAMINOPHEN 5; 325 MG/1; MG/1
1 TABLET ORAL PRN
Status: DISCONTINUED | OUTPATIENT
Start: 2018-09-07 | End: 2018-09-07 | Stop reason: HOSPADM

## 2018-09-07 RX ORDER — FENTANYL CITRATE 50 UG/ML
25 INJECTION, SOLUTION INTRAMUSCULAR; INTRAVENOUS EVERY 5 MIN PRN
Status: DISCONTINUED | OUTPATIENT
Start: 2018-09-07 | End: 2018-09-07 | Stop reason: HOSPADM

## 2018-09-07 RX ORDER — SIMETHICONE 80 MG
80 TABLET,CHEWABLE ORAL 4 TIMES DAILY PRN
Status: DISCONTINUED | OUTPATIENT
Start: 2018-09-07 | End: 2018-09-08 | Stop reason: HOSPADM

## 2018-09-07 RX ORDER — MAGNESIUM HYDROXIDE 1200 MG/15ML
LIQUID ORAL CONTINUOUS PRN
Status: COMPLETED | OUTPATIENT
Start: 2018-09-07 | End: 2018-09-07

## 2018-09-07 RX ORDER — ONDANSETRON 2 MG/ML
4 INJECTION INTRAMUSCULAR; INTRAVENOUS EVERY 6 HOURS PRN
Status: DISCONTINUED | OUTPATIENT
Start: 2018-09-07 | End: 2018-09-08 | Stop reason: HOSPADM

## 2018-09-07 RX ORDER — ONDANSETRON 2 MG/ML
4 INJECTION INTRAMUSCULAR; INTRAVENOUS
Status: DISCONTINUED | OUTPATIENT
Start: 2018-09-07 | End: 2018-09-07 | Stop reason: HOSPADM

## 2018-09-07 RX ORDER — FENTANYL CITRATE 50 UG/ML
INJECTION, SOLUTION INTRAMUSCULAR; INTRAVENOUS PRN
Status: DISCONTINUED | OUTPATIENT
Start: 2018-09-07 | End: 2018-09-07 | Stop reason: SDUPTHER

## 2018-09-07 RX ORDER — NIFEDIPINE 30 MG/1
30 TABLET, EXTENDED RELEASE ORAL DAILY
Status: DISCONTINUED | OUTPATIENT
Start: 2018-09-07 | End: 2018-09-08 | Stop reason: HOSPADM

## 2018-09-07 RX ORDER — LABETALOL HYDROCHLORIDE 5 MG/ML
INJECTION, SOLUTION INTRAVENOUS PRN
Status: DISCONTINUED | OUTPATIENT
Start: 2018-09-07 | End: 2018-09-07 | Stop reason: SDUPTHER

## 2018-09-07 RX ORDER — SODIUM CHLORIDE 9 MG/ML
INJECTION, SOLUTION INTRAVENOUS CONTINUOUS
Status: DISCONTINUED | OUTPATIENT
Start: 2018-09-07 | End: 2018-09-08 | Stop reason: HOSPADM

## 2018-09-07 RX ORDER — LIDOCAINE HYDROCHLORIDE 10 MG/ML
INJECTION, SOLUTION EPIDURAL; INFILTRATION; INTRACAUDAL; PERINEURAL PRN
Status: DISCONTINUED | OUTPATIENT
Start: 2018-09-07 | End: 2018-09-07 | Stop reason: SDUPTHER

## 2018-09-07 RX ORDER — SODIUM CHLORIDE, SODIUM LACTATE, POTASSIUM CHLORIDE, CALCIUM CHLORIDE 600; 310; 30; 20 MG/100ML; MG/100ML; MG/100ML; MG/100ML
INJECTION, SOLUTION INTRAVENOUS CONTINUOUS
Status: DISCONTINUED | OUTPATIENT
Start: 2018-09-07 | End: 2018-09-07

## 2018-09-07 RX ORDER — LABETALOL HYDROCHLORIDE 5 MG/ML
5 INJECTION, SOLUTION INTRAVENOUS EVERY 10 MIN PRN
Status: DISCONTINUED | OUTPATIENT
Start: 2018-09-07 | End: 2018-09-07 | Stop reason: HOSPADM

## 2018-09-07 RX ORDER — SODIUM CHLORIDE 0.9 % (FLUSH) 0.9 %
10 SYRINGE (ML) INJECTION PRN
Status: DISCONTINUED | OUTPATIENT
Start: 2018-09-07 | End: 2018-09-08 | Stop reason: HOSPADM

## 2018-09-07 RX ORDER — FENTANYL CITRATE 50 UG/ML
50 INJECTION, SOLUTION INTRAMUSCULAR; INTRAVENOUS EVERY 5 MIN PRN
Status: COMPLETED | OUTPATIENT
Start: 2018-09-07 | End: 2018-09-07

## 2018-09-07 RX ORDER — OXYCODONE HYDROCHLORIDE AND ACETAMINOPHEN 5; 325 MG/1; MG/1
2 TABLET ORAL EVERY 4 HOURS PRN
Status: DISCONTINUED | OUTPATIENT
Start: 2018-09-07 | End: 2018-09-08 | Stop reason: HOSPADM

## 2018-09-07 RX ORDER — HYDRALAZINE HYDROCHLORIDE 20 MG/ML
5 INJECTION INTRAMUSCULAR; INTRAVENOUS EVERY 10 MIN PRN
Status: DISCONTINUED | OUTPATIENT
Start: 2018-09-07 | End: 2018-09-07 | Stop reason: HOSPADM

## 2018-09-07 RX ORDER — OXYBUTYNIN CHLORIDE 10 MG/1
10 TABLET, EXTENDED RELEASE ORAL DAILY
Status: DISCONTINUED | OUTPATIENT
Start: 2018-09-07 | End: 2018-09-08 | Stop reason: HOSPADM

## 2018-09-07 RX ORDER — ACETAMINOPHEN 325 MG/1
650 TABLET ORAL EVERY 6 HOURS PRN
COMMUNITY

## 2018-09-07 RX ORDER — NEOSTIGMINE METHYLSULFATE 5 MG/5 ML
SYRINGE (ML) INTRAVENOUS PRN
Status: DISCONTINUED | OUTPATIENT
Start: 2018-09-07 | End: 2018-09-07 | Stop reason: SDUPTHER

## 2018-09-07 RX ORDER — ROCURONIUM BROMIDE 10 MG/ML
INJECTION, SOLUTION INTRAVENOUS PRN
Status: DISCONTINUED | OUTPATIENT
Start: 2018-09-07 | End: 2018-09-07 | Stop reason: SDUPTHER

## 2018-09-07 RX ORDER — SODIUM CHLORIDE 0.9 % (FLUSH) 0.9 %
10 SYRINGE (ML) INJECTION EVERY 12 HOURS SCHEDULED
Status: DISCONTINUED | OUTPATIENT
Start: 2018-09-07 | End: 2018-09-08 | Stop reason: HOSPADM

## 2018-09-07 RX ORDER — CEFTRIAXONE 1 G/1
INJECTION, POWDER, FOR SOLUTION INTRAMUSCULAR; INTRAVENOUS PRN
Status: DISCONTINUED | OUTPATIENT
Start: 2018-09-07 | End: 2018-09-07 | Stop reason: SDUPTHER

## 2018-09-07 RX ORDER — MORPHINE SULFATE 2 MG/ML
2 INJECTION, SOLUTION INTRAMUSCULAR; INTRAVENOUS EVERY 5 MIN PRN
Status: DISCONTINUED | OUTPATIENT
Start: 2018-09-07 | End: 2018-09-07 | Stop reason: HOSPADM

## 2018-09-07 RX ORDER — OXYCODONE HYDROCHLORIDE AND ACETAMINOPHEN 5; 325 MG/1; MG/1
2 TABLET ORAL PRN
Status: DISCONTINUED | OUTPATIENT
Start: 2018-09-07 | End: 2018-09-07 | Stop reason: HOSPADM

## 2018-09-07 RX ADMIN — SODIUM CHLORIDE, POTASSIUM CHLORIDE, SODIUM LACTATE AND CALCIUM CHLORIDE: 600; 310; 30; 20 INJECTION, SOLUTION INTRAVENOUS at 08:18

## 2018-09-07 RX ADMIN — Medication 0.2 MG: at 08:32

## 2018-09-07 RX ADMIN — PROPOFOL 80 MCG/KG/MIN: 10 INJECTION, EMULSION INTRAVENOUS at 08:36

## 2018-09-07 RX ADMIN — NIFEDIPINE 30 MG: 30 TABLET, FILM COATED, EXTENDED RELEASE ORAL at 17:02

## 2018-09-07 RX ADMIN — FENTANYL CITRATE 50 MCG: 50 INJECTION INTRAMUSCULAR; INTRAVENOUS at 14:01

## 2018-09-07 RX ADMIN — LIDOCAINE HYDROCHLORIDE 50 MG: 10 INJECTION, SOLUTION EPIDURAL; INFILTRATION; INTRACAUDAL at 10:16

## 2018-09-07 RX ADMIN — CEFTRIAXONE SODIUM 1 G: 1 INJECTION, POWDER, FOR SOLUTION INTRAMUSCULAR; INTRAVENOUS at 08:33

## 2018-09-07 RX ADMIN — DEXAMETHASONE SODIUM PHOSPHATE 8 MG: 4 INJECTION, SOLUTION INTRAMUSCULAR; INTRAVENOUS at 08:36

## 2018-09-07 RX ADMIN — LABETALOL HYDROCHLORIDE 5 MG: 5 INJECTION, SOLUTION INTRAVENOUS at 13:18

## 2018-09-07 RX ADMIN — Medication 3 MG: at 12:54

## 2018-09-07 RX ADMIN — SODIUM CHLORIDE: 9 INJECTION, SOLUTION INTRAVENOUS at 17:07

## 2018-09-07 RX ADMIN — IOTHALAMATE MEGLUMINE 40 ML: 430 INJECTION INTRAVASCULAR at 11:55

## 2018-09-07 RX ADMIN — Medication 0.4 MG: at 12:54

## 2018-09-07 RX ADMIN — FENTANYL CITRATE 50 MCG: 50 INJECTION INTRAMUSCULAR; INTRAVENOUS at 14:29

## 2018-09-07 RX ADMIN — FENTANYL CITRATE 100 MCG: 50 INJECTION INTRAMUSCULAR; INTRAVENOUS at 10:31

## 2018-09-07 RX ADMIN — SODIUM CHLORIDE: 9 INJECTION, SOLUTION INTRAVENOUS at 12:00

## 2018-09-07 RX ADMIN — FENTANYL CITRATE 100 MCG: 50 INJECTION INTRAMUSCULAR; INTRAVENOUS at 12:18

## 2018-09-07 RX ADMIN — OXYCODONE HYDROCHLORIDE AND ACETAMINOPHEN 2 TABLET: 5; 325 TABLET ORAL at 21:17

## 2018-09-07 RX ADMIN — MIDAZOLAM HYDROCHLORIDE 2 MG: 1 INJECTION, SOLUTION INTRAMUSCULAR; INTRAVENOUS at 08:30

## 2018-09-07 RX ADMIN — PROPOFOL 160 MG: 10 INJECTION, EMULSION INTRAVENOUS at 10:16

## 2018-09-07 RX ADMIN — HYDRALAZINE HYDROCHLORIDE 5 MG: 20 INJECTION INTRAMUSCULAR; INTRAVENOUS at 14:14

## 2018-09-07 RX ADMIN — ONDANSETRON 4 MG: 2 INJECTION, SOLUTION INTRAMUSCULAR; INTRAVENOUS at 12:49

## 2018-09-07 RX ADMIN — FENTANYL CITRATE 50 MCG: 50 INJECTION INTRAMUSCULAR; INTRAVENOUS at 13:19

## 2018-09-07 RX ADMIN — DIPHENHYDRAMINE HYDROCHLORIDE 12.5 MG: 50 INJECTION, SOLUTION INTRAMUSCULAR; INTRAVENOUS at 09:19

## 2018-09-07 RX ADMIN — FENTANYL CITRATE 25 MCG: 50 INJECTION INTRAMUSCULAR; INTRAVENOUS at 08:40

## 2018-09-07 RX ADMIN — FENTANYL CITRATE 25 MCG: 50 INJECTION INTRAMUSCULAR; INTRAVENOUS at 09:19

## 2018-09-07 RX ADMIN — SODIUM CHLORIDE: 9 INJECTION, SOLUTION INTRAVENOUS at 11:40

## 2018-09-07 RX ADMIN — OXYBUTYNIN CHLORIDE 10 MG: 10 TABLET, FILM COATED, EXTENDED RELEASE ORAL at 17:04

## 2018-09-07 RX ADMIN — OXYCODONE HYDROCHLORIDE AND ACETAMINOPHEN 2 TABLET: 5; 325 TABLET ORAL at 17:02

## 2018-09-07 RX ADMIN — DOCUSATE SODIUM 100 MG: 100 CAPSULE, LIQUID FILLED ORAL at 21:17

## 2018-09-07 RX ADMIN — MIDAZOLAM HYDROCHLORIDE 2 MG: 1 INJECTION, SOLUTION INTRAMUSCULAR; INTRAVENOUS at 12:00

## 2018-09-07 RX ADMIN — FENTANYL CITRATE 50 MCG: 50 INJECTION INTRAMUSCULAR; INTRAVENOUS at 12:28

## 2018-09-07 RX ADMIN — ROCURONIUM BROMIDE 50 MG: 10 INJECTION INTRAVENOUS at 10:16

## 2018-09-07 RX ADMIN — ONDANSETRON 4 MG: 2 INJECTION, SOLUTION INTRAMUSCULAR; INTRAVENOUS at 08:36

## 2018-09-07 RX ADMIN — FENTANYL CITRATE 50 MCG: 50 INJECTION INTRAMUSCULAR; INTRAVENOUS at 08:32

## 2018-09-07 RX ADMIN — HYDRALAZINE HYDROCHLORIDE 5 MG: 20 INJECTION INTRAMUSCULAR; INTRAVENOUS at 14:33

## 2018-09-07 RX ADMIN — FENTANYL CITRATE 50 MCG: 50 INJECTION INTRAMUSCULAR; INTRAVENOUS at 13:23

## 2018-09-07 RX ADMIN — FENTANYL CITRATE 50 MCG: 50 INJECTION INTRAMUSCULAR; INTRAVENOUS at 13:43

## 2018-09-07 RX ADMIN — LABETALOL HYDROCHLORIDE 10 MG: 5 INJECTION, SOLUTION INTRAVENOUS at 13:14

## 2018-09-07 RX ADMIN — TAMSULOSIN HYDROCHLORIDE 0.4 MG: 0.4 CAPSULE ORAL at 17:02

## 2018-09-07 RX ADMIN — PROPOFOL 50 MG: 10 INJECTION, EMULSION INTRAVENOUS at 08:36

## 2018-09-07 RX ADMIN — FENTANYL CITRATE 25 MCG: 50 INJECTION INTRAMUSCULAR; INTRAVENOUS at 08:35

## 2018-09-07 RX ADMIN — FENTANYL CITRATE 50 MCG: 50 INJECTION INTRAMUSCULAR; INTRAVENOUS at 14:16

## 2018-09-07 ASSESSMENT — PULMONARY FUNCTION TESTS
PIF_VALUE: 21
PIF_VALUE: 20
PIF_VALUE: 18
PIF_VALUE: 21
PIF_VALUE: 0
PIF_VALUE: 0
PIF_VALUE: 18
PIF_VALUE: 3
PIF_VALUE: 9
PIF_VALUE: 19
PIF_VALUE: 15
PIF_VALUE: 14
PIF_VALUE: 19
PIF_VALUE: 20
PIF_VALUE: 18
PIF_VALUE: 1
PIF_VALUE: 20
PIF_VALUE: 18
PIF_VALUE: 20
PIF_VALUE: 14
PIF_VALUE: 14
PIF_VALUE: 0
PIF_VALUE: 14
PIF_VALUE: 0
PIF_VALUE: 18
PIF_VALUE: 0
PIF_VALUE: 18
PIF_VALUE: 17
PIF_VALUE: 20
PIF_VALUE: 18
PIF_VALUE: 10
PIF_VALUE: 18
PIF_VALUE: 17
PIF_VALUE: 18
PIF_VALUE: 18
PIF_VALUE: 20
PIF_VALUE: 18
PIF_VALUE: 21
PIF_VALUE: 1
PIF_VALUE: 18
PIF_VALUE: 21
PIF_VALUE: 20
PIF_VALUE: 18
PIF_VALUE: 20
PIF_VALUE: 22
PIF_VALUE: 0
PIF_VALUE: 1
PIF_VALUE: 20
PIF_VALUE: 0
PIF_VALUE: 18
PIF_VALUE: 0
PIF_VALUE: 18
PIF_VALUE: 0
PIF_VALUE: 18
PIF_VALUE: 0
PIF_VALUE: 18
PIF_VALUE: 4
PIF_VALUE: 20
PIF_VALUE: 18
PIF_VALUE: 0
PIF_VALUE: 20
PIF_VALUE: 0
PIF_VALUE: 20
PIF_VALUE: 18
PIF_VALUE: 20
PIF_VALUE: 18
PIF_VALUE: 20
PIF_VALUE: 13
PIF_VALUE: 18
PIF_VALUE: 18
PIF_VALUE: 17
PIF_VALUE: 20
PIF_VALUE: 23
PIF_VALUE: 9
PIF_VALUE: 18
PIF_VALUE: 18
PIF_VALUE: 21
PIF_VALUE: 22
PIF_VALUE: 21
PIF_VALUE: 0
PIF_VALUE: 20
PIF_VALUE: 14
PIF_VALUE: 21
PIF_VALUE: 20
PIF_VALUE: 0
PIF_VALUE: 19
PIF_VALUE: 18
PIF_VALUE: 0
PIF_VALUE: 20
PIF_VALUE: 19
PIF_VALUE: 21
PIF_VALUE: 22
PIF_VALUE: 19
PIF_VALUE: 19
PIF_VALUE: 0
PIF_VALUE: 19
PIF_VALUE: 20
PIF_VALUE: 6
PIF_VALUE: 18
PIF_VALUE: 20
PIF_VALUE: 4
PIF_VALUE: 18
PIF_VALUE: 20
PIF_VALUE: 18
PIF_VALUE: 19
PIF_VALUE: 0
PIF_VALUE: 18
PIF_VALUE: 20
PIF_VALUE: 19
PIF_VALUE: 0
PIF_VALUE: 20
PIF_VALUE: 27
PIF_VALUE: 21
PIF_VALUE: 18
PIF_VALUE: 19
PIF_VALUE: 20
PIF_VALUE: 0
PIF_VALUE: 0
PIF_VALUE: 18
PIF_VALUE: 18
PIF_VALUE: 0
PIF_VALUE: 21
PIF_VALUE: 18
PIF_VALUE: 19
PIF_VALUE: 27
PIF_VALUE: 18
PIF_VALUE: 14
PIF_VALUE: 18
PIF_VALUE: 0
PIF_VALUE: 18
PIF_VALUE: 19
PIF_VALUE: 0
PIF_VALUE: 21
PIF_VALUE: 18
PIF_VALUE: 18
PIF_VALUE: 3
PIF_VALUE: 21
PIF_VALUE: 18
PIF_VALUE: 0
PIF_VALUE: 20
PIF_VALUE: 20
PIF_VALUE: 18
PIF_VALUE: 18
PIF_VALUE: 14
PIF_VALUE: 18
PIF_VALUE: 18
PIF_VALUE: 0
PIF_VALUE: 20
PIF_VALUE: 10
PIF_VALUE: 18
PIF_VALUE: 19
PIF_VALUE: 14
PIF_VALUE: 18
PIF_VALUE: 20
PIF_VALUE: 18
PIF_VALUE: 2
PIF_VALUE: 20
PIF_VALUE: 18
PIF_VALUE: 18
PIF_VALUE: 20
PIF_VALUE: 18
PIF_VALUE: 21
PIF_VALUE: 18
PIF_VALUE: 19
PIF_VALUE: 18
PIF_VALUE: 18
PIF_VALUE: 9
PIF_VALUE: 18
PIF_VALUE: 18
PIF_VALUE: 12

## 2018-09-07 ASSESSMENT — PAIN DESCRIPTION - DESCRIPTORS: DESCRIPTORS: BURNING

## 2018-09-07 ASSESSMENT — PAIN SCALES - GENERAL
PAINLEVEL_OUTOF10: 6
PAINLEVEL_OUTOF10: 9
PAINLEVEL_OUTOF10: 7
PAINLEVEL_OUTOF10: 0
PAINLEVEL_OUTOF10: 7
PAINLEVEL_OUTOF10: 10
PAINLEVEL_OUTOF10: 3
PAINLEVEL_OUTOF10: 4
PAINLEVEL_OUTOF10: 9
PAINLEVEL_OUTOF10: 7
PAINLEVEL_OUTOF10: 10
PAINLEVEL_OUTOF10: 0
PAINLEVEL_OUTOF10: 8
PAINLEVEL_OUTOF10: 9
PAINLEVEL_OUTOF10: 8
PAINLEVEL_OUTOF10: 3
PAINLEVEL_OUTOF10: 6

## 2018-09-07 ASSESSMENT — PAIN - FUNCTIONAL ASSESSMENT: PAIN_FUNCTIONAL_ASSESSMENT: 0-10

## 2018-09-07 ASSESSMENT — PAIN SCALES - WONG BAKER: WONGBAKER_NUMERICALRESPONSE: 0

## 2018-09-07 ASSESSMENT — PAIN DESCRIPTION - LOCATION: LOCATION: GROIN

## 2018-09-07 NOTE — ANESTHESIA POSTPROCEDURE EVALUATION
Department of Anesthesiology  Postprocedure Note    Patient: Pastora Olivier  MRN: [id number]  YOB: 1985  Date of evaluation: 9/7/2018  Time:  1:34 PM     Procedure Summary     Date:  09/07/18 Room / Location:  [facility] OR  / [facility] OR    Anesthesia Start:  0830 Anesthesia Stop:  7910    Procedure:  CYSTOSCOPY, INSERTION OCCLUSIVE BALLOON - PT TO INTERV RADIOLOGY POST OP (Left Ureter) Diagnosis:  (LEFT KIDNEY CALCULUS, PYELONEPHRITIS)    Surgeon:  Rachelle Sosa MD Responsible Provider:  Tre Escobedo MD    Anesthesia Type:  MAC, general ASA Status:  2          Anesthesia Type: MAC, general    Dwight Phase I:      Dwight Phase II: Dwight Score: 9    Last vitals: Reviewed and per EMR flowsheets.        Anesthesia Post Evaluation    Patient location during evaluation: PACU  Patient participation: complete - patient participated  Level of consciousness: sleepy but conscious  Pain score: 2  Airway patency: patent  Nausea & Vomiting: no vomiting  Complications: no  Cardiovascular status: hemodynamically stable  Respiratory status: acceptable  Hydration status: stable

## 2018-09-07 NOTE — ANESTHESIA POSTPROCEDURE EVALUATION
Department of Anesthesiology  Postprocedure Note    Patient: Forest Phillips  MRN: [id number]  YOB: 1985  Date of evaluation: 9/7/2018  Time:  3:29 PM     Procedure Summary     Date:  09/07/18 Room / Location:  [facility]    Anesthesia Start:  4723 Anesthesia Stop:  1200    Procedure:  IR NEPHROSTOMY PERCUTANEOUS LEFT Diagnosis:       Renal calculus      (Left wire to bladder)    Scheduled Providers:   Responsible Provider:  Randall Villaseñor MD    Anesthesia Type:  MAC, general ASA Status:  2          Anesthesia Type: MAC, general    Dwight Phase I:      Dwight Phase II:      Last vitals: Reviewed and per EMR flowsheets. Anesthesia Post Evaluation    Patient location during evaluation: PACU (Patient taken directly to OR, Intubated)  Patient participation: complete - patient cannot participate  Level of consciousness: sedated and ventilated  Pain score: 0  Airway patency: patent  Nausea & Vomiting: no vomiting and no nausea  Complications: no  Cardiovascular status: hemodynamically stable  Respiratory status: ventilator  Hydration status: stable  Comments: This patient went directly to OR for part 3 of 3 procedures.

## 2018-09-07 NOTE — ANESTHESIA PRE PROCEDURE
Department of Anesthesiology  Preprocedure Note       Name:  Mick Berrios   Age:  35 y.o.  :  1985                                          MRN:  [id number]         Date:  2018      Surgeon: Raegan Peck):  Milka Rosas MD    Procedure: Procedure(s):  CYSTOSCOPY, INSERTION OCCLUSIVE BALLOON - PT TO INTERV RADIOLOGY POST OP    Medications prior to admission:   Prior to Admission medications    Medication Sig Start Date End Date Taking? Authorizing Provider   acetaminophen (TYLENOL) 325 MG tablet Take 650 mg by mouth every 6 hours as needed for Pain   Yes Historical Provider, MD   oxyCODONE-acetaminophen (PERCOCET) 5-325 MG per tablet Take 1 tablet by mouth every 6 hours as needed. . 18  Yes Historical Provider, MD   ibuprofen (ADVIL;MOTRIN) 800 MG tablet Take 800 mg by mouth every 8 hours as needed LAST DOSE 2018 7/10/18  Yes Historical Provider, MD   NIFEdipine (PROCARDIA XL) 30 MG extended release tablet Take 30 mg by mouth daily 18   Historical Provider, MD   ondansetron (ZOFRAN-ODT) 8 MG TBDP disintegrating tablet Take 8 mg by mouth every 8 hours as needed 18   Historical Provider, MD   GAS RELIEF 80 MG chewable tablet Take 80 mg by mouth as needed 7/10/18   Historical Provider, MD   oxybutynin (DITROPAN-XL) 10 MG extended release tablet Take 1 tablet by mouth daily 8/3/17   Milka Rosas MD   docusate sodium (COLACE) 100 MG capsule Take 100 mg by mouth daily as needed     Historical Provider, MD       Current medications:    Current Facility-Administered Medications   Medication Dose Route Frequency Provider Last Rate Last Dose    lactated ringers infusion   Intravenous Continuous Liat Solis MD        cefTRIAXone (ROCEPHIN) 1 g IVPB in 50 mL D5W minibag  1 g Intravenous Q24H Milka Rosas MD           Allergies:     Allergies   Allergen Reactions    Levaquin [Levofloxacin In D5w] Itching and Rash       Problem List:    Patient Active Problem List   Diagnosis Code    Benign essential hypertension, antepartum O10.019    History of  labor, current pregnancy O09.219    Pregnancy with other poor obstetric history(V23.49) O09.299    Placenta previa without hemorrhage, antepartum O44.00    Low lying placenta without hemorrhage, antepartum O44.40    Coagulation defect affecting pregnancy, antepartum ([facility] [address] O99.119, D68.9    Other problem associated with amniotic cavity and membranes, antepartum O41.8X90    Mixed incontinence N39.46    Kidney stone on left side N20.0       Past Medical History:        Diagnosis Date    Abnormal Pap smear of cervix     at 25years old    Blood in urine     WITH BLOOD CLOTS    Difficult intravenous access     [address] TO SEE REQUEST IV BE PUT IN HAND    Fall 2016    TRIPPED OVER H2O HOSE IN BASEMENT FRACTURED RIGHT ANKLE    Flank pain     Gall stones 2018    Heart palpitations     Hematuria     Hypertension 2014    ON RX    Kidney stones 2004, 2007 & 2016 2018    PONV (postoperative nausea and vomiting)     request anti emetic    Sensation of pressure in bladder area     Ureteral stent retained     tami    Urinary incontinence     Urinary leakage     Urinary urgency     Vitamin D deficiency     Wears glasses        Past Surgical History:        Procedure Laterality Date    ANKLE FRACTURE SURGERY Right 2016    WITH HARDWARE    CHOLECYSTECTOMY  2018    COLPOSCOPY      at age 25 years s/p abnormal pap smear    CYSTOSCOPY  16    video urodynamics/cystoscopy under local anesthesia    CYSTOSCOPY  10/08/2016    [facility]AT [facility]    CYSTOSCOPY      WITH STONE BASKETING    CYSTOSCOPY Bilateral 10/13/2016    bilat retrogrades, bilat stent change, left stone basketing, right holmium laser    HERNIA REPAIR  6132    UMBILICAL    LITHOTRIPSY      OTHER SURGICAL HISTORY Bilateral     sent removal; cysto    PELVIC LAPAROSCOPY  2014    with d and c, hysteroscopy    TUBAL LIGATION      URETER STENT PLACEMENT  2007       Social History:    Social History   Substance Use Topics    Smoking status: Former Smoker     Quit date: 7/31/2005    Smokeless tobacco: Never Used    Alcohol use No                                Counseling given: Not Answered      Vital Signs (Current): There were no vitals filed for this visit. BP Readings from Last 3 Encounters:   08/23/18 (!) 147/92   03/22/18 129/84   08/03/17 (!) 140/99       NPO Status: Time of last liquid consumption: 2200                        Time of last solid consumption: 2200                        Date of last liquid consumption: 09/06/18                        Date of last solid food consumption: 09/06/18    BMI:   Wt Readings from Last 3 Encounters:   08/23/18 165 lb (74.8 kg)   03/22/18 174 lb (78.9 kg)   08/03/17 167 lb (75.8 kg)     There is no height or weight on file to calculate BMI.    CBC:   Lab Results   Component Value Date    WBC 7.9 08/23/2018    RBC 4.64 08/23/2018    HGB 12.7 08/23/2018    HCT 40.2 08/23/2018    MCV 86.6 08/23/2018    RDW 14.6 08/23/2018     08/23/2018       CMP:   Lab Results   Component Value Date     08/23/2018    K 3.7 08/23/2018    CL 97 08/23/2018    CO2 22 08/23/2018    BUN 14 08/23/2018    CREATININE 0.92 08/23/2018    GFRAA >60 08/23/2018    LABGLOM >60 08/23/2018    GLUCOSE 92 08/23/2018    PROT 8.7 08/23/2018    CALCIUM 9.4 08/23/2018    BILITOT 0.37 08/23/2018    ALKPHOS 86 08/23/2018    AST 23 08/23/2018    ALT 27 08/23/2018       POC Tests: No results for input(s): POCGLU, POCNA, POCK, POCCL, POCBUN, POCHEMO, POCHCT in the last 72 hours.     Coags:   Lab Results   Component Value Date    PROTIME 10.8 08/23/2018    INR 1.0 08/23/2018    APTT 24.3 02/12/2014       HCG (If Applicable):   Lab Results   Component Value Date    HCG NEGATIVE 10/20/2016        ABGs: No results found

## 2018-09-07 NOTE — OP NOTE
FACILITY:  [address] [place] [address]    PATIENT:  Richi Azar  MRN: [id number]  YOB: 1985   DATE: 9/7/2018     SURGEON: Christal De MD, FACS  Asst: None     PREOPERATIVE DIAGNOSIS:  Left renal calculus/calculi.     POSTOPERATIVE DIAGNOSIS:  Left renal calculus/calculi.     PROCEDURE:  Cystoscopy with Left ureteral stent removal and left ureteral occlusion balloon catheter placement and retrograde pyelogram.     ANESTHESIA: Monitor Anesthesia Care     INDICATIONS:  This is a 35 y.o. female patient who has a large Left renal calculus/cakculi. Patient is scheduled for percutaneous nephrolithotomy. Patient is scheduled to have this procedure. The risks, benefits and alternatives were explained  and informed consent was signed. The patient was given Rocephin 1 gm IV on call to OR for antibiotic prophylaxis. Patient had EPC cuffs placed for VTE prophylaxis.     OPERATIVE NARRATIVE:  The patient was brought to the operating room. Patient was properly  identified. The procedure was confirmed verbally. The patient was  administered an anesthetic. Patient was placed in modified dorsal  lithotomy position and prepped and draped in sterile fashion. A rigid  cystoscope was introduced into the bladder and the left ureteral stent was grasped and brought out through the urethra. Next a glidewire was passed into the Left kidney, then the cystoscope was removed and a ureteral occlusion  balloon catheter up to the Left kidney. The wire was pulled out  and a Cervantes catheter was placed into the bladder and the ureteral occlusion  balloon catheter was secured to the Cervantes catheter with pink anesthesia tape. A  retrograde pyelogram was then shot, which did demonstrate opacification of  the stone and the collecting system/calyces. This was used to help us determine where to ask Interventional Radiology to place access.    It appears the lower pole access will be best.  Then the procedure was

## 2018-09-07 NOTE — H&P
[facility] Progress Note        Patient:  Laura Christine  YOB: 1985  Date: 9/7/2018      HISTORY OF PRESENT ILLNESS:   The patient is a 28 y.o. female who presents today for follow-up for the following problem(s): Urinary frequency / urgency, ASAF, non obstructing kidney stones. Overall the problem(s) : Improving  Associated Symptoms: No dysuria, gross hematuria. Pain Severity: Pain Score:   0 - No pain (denies and pain)     Summary of old records:  History of BL kidney stones s/p BL urs and HLL 10/13/16, and BL stents finally removed 10/20/16.    3/9/17 KUB punctate left KS  Mixed incontinence, urge > stress; increase oxybutynin ER 10 mg qd; 2 ppd     Additional History: Patient has seen improvement in her Urge urinary incontinence with Oxybutynin ER 10 mg po qd for OAB symptoms. No Rx required for her non-obstructing punctate left renal calculi. ASAF will be evaluated after delivery.   Definitive treatment will be deferred till the patient is finished growing family.     Imaging Reviewed during this Office Visit:   (results were independently reviewed by physician and radiology report verified)     Urinalysis today:  No results found for this visit on 03/22/18.     Last BUN and creatinine:  No results found for: BUN        Lab Results   Component Value Date     CREATININE 0.8 10/13/2016         PAST MEDICAL, FAMILY AND SOCIAL HISTORY UPDATE:  Past Medical History        Past Medical History:   Diagnosis Date    Abnormal Pap smear of cervix       at 25years old    Blood in urine 2016     WITH BLOOD CLOTS    Difficult intravenous access       STATES VEINS ARE SMALL AND HARD TO SEE REQUEST IV BE PUT IN HAND    Flank pain      Heart palpitations      Hematuria      Hypertension 2014     ON RX    Kidney stones 2004, 2007 & 2016    PONV (postoperative nausea and vomiting)       request anti emetic    Sensation of pressure in bladder area      Ureteral stent retained       tami positive for frequency and dysuria  Musculoskeletal: negative  Skin: negative   Neurological: negative  Hematological/Lymphatic: negative  Psychological: negative     Physical Exam:           Vitals:     03/22/18 0921   BP: 129/84   Pulse: 91   Temp: 98 °F (36.7 °C)      Patient is a 28 y.o. female in no acute distress and alert and oriented to person, place and time. Lungs CTA  Heart R3 without murmur  Abdomen: Soft NT ND    Assessment and Plan   1. Frequency of urination    2. Stress incontinence    3. Kidney stone on left side          Plan:   Patient here today for a cystoscopy, left ureteral occlusion balloon catheter insertion and left Percutaneous Nephrolithotomy as a 3 part procedure under GA/MAC.     I have discussed the care of this patient including pertinent history and exam findings, with the resident. I have seen and examined the patient and the key elements of all parts of the encounter have been performed by me. I agree with the assessment, plan and orders as documented by the resident.  Marianne Hampton MD, FACS

## 2018-09-07 NOTE — OP NOTE
Operative Note    Location: [address], [place], [place]    Date: 9/7/2018    Agustin Hilliard  [id number]  1985    Surgeon: Elizabeth Deras MD    Assistant: Nicolasa Gentile MD    Pre-operative diagnosis: left renal calculus    Post-operative diagnosis: left renal calculus    Procedure performed: left percutaneous nephrolithotomy (stone greater than than 2 cm), dilation of percutaneous nephrostomy tract, antegrade nephrostogram, nephrostomy tube placement    Anesthesia: General    Estimated blood loss: Minimal    Complications: none    Specimens: Stones for analysis    Drains: 24 Lithuanian Malecot nephroureteral reentry catheter    Indications: Agustin Hilliard is a 35 y.o. female who was found to have a large left-sided stone burden consisting of approximately 2 cm in the lower pole and approximately 8 mm in the mid pole. She presents for percutaneous nephrolithotomy. She appears to underwent left stent removal and occlusion balloon catheter placement this morning in the operating room and interventional radiology obtained percutaneous access to the left renal collecting system. Operative summary:   The risks and benefits of the procedure explained to the patient in the preoperative area and after informed consent was obtained the patient was taken back to the operative room. General anesthesia was induced on the patient's transport stretcher. She was then transferred to the operating table and placed in the prone position. All pressure points were padded. EPC cuffs were on and functioning prior to induction of anesthesia. The patient received intravenous ceftriaxone prior to the procedure. The patient was prepped and draped in a sterile fashion and a time-out was performed to confirm patient identity, procedure, and laterality. A guidewire was advanced through the nephroureteral catheter down to the level of the bladder.   The nephroureteral catheter was removed and the skin was incised adjacent to the wire using a 15 blade scalpel. A second guidewire was placed with the assistance of a Jac Kawasaki. A NephroMax balloon dilator was advanced over one guidewire and into the collecting system. The dilator balloon was filled to a pressure of 18 cm of water and an access sheath was advanced over the dilator balloon and into the collecting system. The balloon dilator was removed and a nephroscope was inserted through the sheath. Using the lithoclast and the pneumatic and ultrasound settings, the patient's lower pole stones were fragmented and removed using graspers. A flexible cystoscope was then inserted and the patient's midpole stone was removed using a 0 tip basket. The remainder of the kidney was evaluated using the flexible cystoscope and no additional stones were seen. A 24 Telugu nephroureteral reentry catheter was advanced over the guidewire and into the collecting system under fluoroscopy. The malecot was deployed and the guidewire was removed. Cystografin contrast was injected under fluoroscopy which confirmed the presence of the nephrostomy tube in the collecting system. The sheath was removed and a U-stitch with 0 Ethibond suture was used to secure the nephrostomy tube in position. The patient was awoken and transferred to the PACU. Disposition:  Patient was transferred to PACU in good condition. The nephrostomy tube will be clamped tomorrow morning and an antegrade nephrostogram will be performed. Adan Amaya MD  9/7/2018 1:28 PM

## 2018-09-07 NOTE — ANESTHESIA PRE PROCEDURE
Department of Anesthesiology  Preprocedure Note       Name:  Mick Berrios   Age:  35 y.o.  :  1985                                          MRN:  [id number]         Date:  2018      Surgeon: * No surgeons listed *    Procedure: Procedure(s):  IR     Medications prior to admission:   Prior to Admission medications    Medication Sig Start Date End Date Taking? Authorizing Provider   acetaminophen (TYLENOL) 325 MG tablet Take 650 mg by mouth every 6 hours as needed for Pain    Historical Provider, MD   NIFEdipine (PROCARDIA XL) 30 MG extended release tablet Take 30 mg by mouth daily 18   Historical Provider, MD   oxyCODONE-acetaminophen (PERCOCET) 5-325 MG per tablet Take 1 tablet by mouth every 6 hours as needed. . 18   Historical Provider, MD   ibuprofen (ADVIL;MOTRIN) 800 MG tablet Take 800 mg by mouth every 8 hours as needed LAST DOSE 2018 7/10/18   Historical Provider, MD   ondansetron (ZOFRAN-ODT) 8 MG TBDP disintegrating tablet Take 8 mg by mouth every 8 hours as needed 18   Historical Provider, MD   GAS RELIEF 80 MG chewable tablet Take 80 mg by mouth as needed 7/10/18   Historical Provider, MD   oxybutynin (DITROPAN-XL) 10 MG extended release tablet Take 1 tablet by mouth daily 8/3/17   Milka Rosas MD   docusate sodium (COLACE) 100 MG capsule Take 100 mg by mouth daily as needed     Historical Provider, MD       Current medications:    No current facility-administered medications for this visit. No current outpatient prescriptions on file. Facility-Administered Medications Ordered in Other Visits   Medication Dose Route Frequency Provider Last Rate Last Dose    lactated ringers infusion   Intravenous Continuous Liat Solis  mL/hr at 18 0818      cefTRIAXone (ROCEPHIN) 1 g IVPB in 50 mL D5W minibag  1 g Intravenous Q24H Milka Rosas MD           Allergies:     Allergies   Allergen Reactions    Levaquin [Levofloxacin In D5w]

## 2018-09-07 NOTE — PLAN OF CARE
Problem: Infection:  Goal: Will remain free from infection  Will remain free from infection   Outcome: Ongoing

## 2018-09-07 NOTE — ANESTHESIA PRE PROCEDURE
0203    fentaNYL (SUBLIMAZE) injection 50 mcg  50 mcg Intravenous Q5 Min PRN Jose Forrester MD        morphine injection 2 mg  2 mg Intravenous Q5 Min PRN Jose Forrester MD        oxyCODONE-acetaminophen (PERCOCET) 5-325 MG per tablet 1 tablet  1 tablet Oral PRN Jose Forrester MD        Or   [facility] oxyCODONE-acetaminophen (PERCOCET) 5-325 MG per tablet 2 tablet  2 tablet Oral PRN Jose Forrester MD        ondansetron [facility]) injection 4 mg  4 mg Intravenous Once PRN Jose Forrester MD        labetalol (NORMODYNE;TRANDATE) injection 5 mg  5 mg Intravenous Q10 Min PRN Jose Forrester MD        hydrALAZINE (APRESOLINE) injection 5 mg  5 mg Intravenous Q10 Min PRN Jose Forrester MD        meperidine (DEMEROL) injection 12.5 mg  12.5 mg Intravenous Q5 Min PRN Jose Forrester MD         Facility-Administered Medications Ordered in Other Encounters   Medication Dose Route Frequency Provider Last Rate Last Dose    midazolam (VERSED) injection    PRN Farnaz Albertoots, APRN - CRNA   2 mg at 18 1200    fentaNYL (SUBLIMAZE) injection    PRN Farnaz Hoots, APRN - CRNA   50 mcg at 18 1228    0.9 % sodium chloride infusion    Continuous PRN Farnaz Hoots, APRN - CRNA           Allergies:     Allergies   Allergen Reactions    Levaquin [Levofloxacin In D5w] Itching and Rash       Problem List:    Patient Active Problem List   Diagnosis Code    Benign essential hypertension, antepartum O10.019    History of  labor, current pregnancy O09.219    Pregnancy with other poor obstetric history(V23.49) O09.299    Placenta previa without hemorrhage, antepartum O44.00    Low lying placenta without hemorrhage, antepartum O44.40    Coagulation defect affecting pregnancy, antepartum ([facility] [address] O99.119, D68.9    Other problem associated with amniotic cavity and membranes, antepartum O41.8X90    Mixed incontinence N39.46    Kidney stone on left side N20.0       Past Medical History:        Diagnosis Date    Abnormal Pap smear of cervix  Temporal     SpO2: 100% 100% 100% 99%   Weight: 165 lb (74.8 kg)      Height: 5' 5\" (1.651 m)                                                 BP Readings from Last 3 Encounters:   09/07/18 (!) 154/99   09/07/18 (!) 131/100   09/07/18 (!) 178/90       NPO Status: Time of last liquid consumption: 2200                        Time of last solid consumption: 2200                        Date of last liquid consumption: 09/06/18                        Date of last solid food consumption: 09/06/18    BMI:   Wt Readings from Last 3 Encounters:   09/07/18 165 lb (74.8 kg)   08/23/18 165 lb (74.8 kg)   03/22/18 174 lb (78.9 kg)     Body mass index is 27.46 kg/m².     CBC:   Lab Results   Component Value Date    WBC 7.1 09/07/2018    RBC 3.69 09/07/2018    HGB 9.9 09/07/2018    HCT 31.4 09/07/2018    MCV 85.1 09/07/2018    RDW 16.0 09/07/2018     09/07/2018       CMP:   Lab Results   Component Value Date     08/23/2018    K 3.7 08/23/2018    CL 97 08/23/2018    CO2 22 08/23/2018    BUN 14 08/23/2018    CREATININE 1.00 09/07/2018    CREATININE 0.92 08/23/2018    GFRAA >60 08/23/2018    LABGLOM >60 09/07/2018    GLUCOSE 92 08/23/2018    PROT 8.7 08/23/2018    CALCIUM 9.4 08/23/2018    BILITOT 0.37 08/23/2018    ALKPHOS 86 08/23/2018    AST 23 08/23/2018    ALT 27 08/23/2018       POC Tests:   Recent Labs      09/07/18   0810   POCGLU  83       Coags:   Lab Results   Component Value Date    PROTIME 10.4 09/07/2018    INR 1.0 09/07/2018    APTT 21.5 09/07/2018       HCG (If Applicable):   Lab Results   Component Value Date    HCG NEGATIVE 09/07/2018        ABGs: No results found for: PHART, PO2ART, MTF8XHK, FSP7OPO, BEART, Y9XBFZWO     Type & Screen (If Applicable):  No results found for: LABABO, [address]    Anesthesia Evaluation     Anesthesia Plan      MAC     ASA 2     (HTN)                            Dandy Brennan MD   9/7/2018

## 2018-09-08 ENCOUNTER — APPOINTMENT (OUTPATIENT)
Dept: INTERVENTIONAL RADIOLOGY/VASCULAR | Age: 33
DRG: 443 | End: 2018-09-08
Attending: SPECIALIST
Payer: MEDICARE

## 2018-09-08 VITALS
BODY MASS INDEX: 27.49 KG/M2 | HEIGHT: 65 IN | SYSTOLIC BLOOD PRESSURE: 128 MMHG | OXYGEN SATURATION: 100 % | TEMPERATURE: 97.8 F | DIASTOLIC BLOOD PRESSURE: 81 MMHG | WEIGHT: 165 LBS | HEART RATE: 78 BPM | RESPIRATION RATE: 16 BRPM

## 2018-09-08 LAB
-: NORMAL
ANION GAP SERPL CALCULATED.3IONS-SCNC: 9 MMOL/L (ref 9–17)
BUN BLDV-MCNC: 5 MG/DL (ref 6–20)
BUN/CREAT BLD: ABNORMAL (ref 9–20)
CALCIUM SERPL-MCNC: 8.2 MG/DL (ref 8.6–10.4)
CHLORIDE BLD-SCNC: 104 MMOL/L (ref 98–107)
CO2: 24 MMOL/L (ref 20–31)
CREAT SERPL-MCNC: 0.62 MG/DL (ref 0.5–0.9)
GFR AFRICAN AMERICAN: >60 ML/MIN
GFR NON-AFRICAN AMERICAN: >60 ML/MIN
GFR SERPL CREATININE-BSD FRML MDRD: ABNORMAL ML/MIN/{1.73_M2}
GFR SERPL CREATININE-BSD FRML MDRD: ABNORMAL ML/MIN/{1.73_M2}
GLUCOSE BLD-MCNC: 92 MG/DL (ref 70–99)
HCT VFR BLD CALC: 28.5 % (ref 36.3–47.1)
HEMOGLOBIN: 9.4 G/DL (ref 11.9–15.1)
MCH RBC QN AUTO: 27.5 PG (ref 25.2–33.5)
MCHC RBC AUTO-ENTMCNC: 33 G/DL (ref 28.4–34.8)
MCV RBC AUTO: 83.3 FL (ref 82.6–102.9)
NRBC AUTOMATED: 0 PER 100 WBC
PDW BLD-RTO: 16.2 % (ref 11.8–14.4)
PLATELET # BLD: 380 K/UL (ref 138–453)
PMV BLD AUTO: 9.4 FL (ref 8.1–13.5)
POTASSIUM SERPL-SCNC: 3.9 MMOL/L (ref 3.7–5.3)
RBC # BLD: 3.42 M/UL (ref 3.95–5.11)
REASON FOR REJECTION: NORMAL
SODIUM BLD-SCNC: 137 MMOL/L (ref 135–144)
WBC # BLD: 15.7 K/UL (ref 3.5–11.3)
ZZ NTE CLEAN UP: ORDERED TEST: NORMAL
ZZ NTE WITH NAME CLEAN UP: SPECIMEN SOURCE: NORMAL

## 2018-09-08 PROCEDURE — 50389 REMOVE RENAL TUBE W/FLUORO: CPT | Performed by: RADIOLOGY

## 2018-09-08 PROCEDURE — 0TP5X0Z REMOVAL OF DRAINAGE DEVICE FROM KIDNEY, EXTERNAL APPROACH: ICD-10-PCS | Performed by: RADIOLOGY

## 2018-09-08 PROCEDURE — 6360000004 HC RX CONTRAST MEDICATION: Performed by: SPECIALIST

## 2018-09-08 PROCEDURE — 80048 BASIC METABOLIC PNL TOTAL CA: CPT

## 2018-09-08 PROCEDURE — 6360000002 HC RX W HCPCS: Performed by: UROLOGY

## 2018-09-08 PROCEDURE — BT1F1ZZ FLUOROSCOPY OF LEFT KIDNEY, URETER AND BLADDER USING LOW OSMOLAR CONTRAST: ICD-10-PCS | Performed by: RADIOLOGY

## 2018-09-08 PROCEDURE — 6370000000 HC RX 637 (ALT 250 FOR IP): Performed by: UROLOGY

## 2018-09-08 PROCEDURE — 96365 THER/PROPH/DIAG IV INF INIT: CPT

## 2018-09-08 PROCEDURE — 51798 US URINE CAPACITY MEASURE: CPT

## 2018-09-08 PROCEDURE — G0378 HOSPITAL OBSERVATION PER HR: HCPCS

## 2018-09-08 PROCEDURE — 85027 COMPLETE CBC AUTOMATED: CPT

## 2018-09-08 PROCEDURE — 94762 N-INVAS EAR/PLS OXIMTRY CONT: CPT

## 2018-09-08 PROCEDURE — 2709999900 HC NON-CHARGEABLE SUPPLY

## 2018-09-08 PROCEDURE — 2580000003 HC RX 258: Performed by: UROLOGY

## 2018-09-08 PROCEDURE — 36415 COLL VENOUS BLD VENIPUNCTURE: CPT

## 2018-09-08 PROCEDURE — 96366 THER/PROPH/DIAG IV INF ADDON: CPT

## 2018-09-08 RX ORDER — OXYCODONE HYDROCHLORIDE AND ACETAMINOPHEN 5; 325 MG/1; MG/1
1-2 TABLET ORAL EVERY 6 HOURS PRN
Qty: 30 TABLET | Refills: 0 | Status: SHIPPED | OUTPATIENT
Start: 2018-09-08 | End: 2018-09-15

## 2018-09-08 RX ORDER — DOCUSATE SODIUM 100 MG/1
100 CAPSULE, LIQUID FILLED ORAL 2 TIMES DAILY
Qty: 28 CAPSULE | Refills: 0 | Status: SHIPPED | OUTPATIENT
Start: 2018-09-08 | End: 2018-09-22

## 2018-09-08 RX ORDER — CEPHALEXIN 500 MG/1
500 CAPSULE ORAL 3 TIMES DAILY
Qty: 9 CAPSULE | Refills: 0 | Status: SHIPPED | OUTPATIENT
Start: 2018-09-08 | End: 2018-09-11

## 2018-09-08 RX ORDER — TAMSULOSIN HYDROCHLORIDE 0.4 MG/1
0.4 CAPSULE ORAL DAILY
Qty: 21 CAPSULE | Refills: 3 | Status: SHIPPED | OUTPATIENT
Start: 2018-09-09 | End: 2018-09-27 | Stop reason: ALTCHOICE

## 2018-09-08 RX ADMIN — OXYBUTYNIN CHLORIDE 10 MG: 10 TABLET, FILM COATED, EXTENDED RELEASE ORAL at 08:28

## 2018-09-08 RX ADMIN — OXYCODONE HYDROCHLORIDE AND ACETAMINOPHEN 2 TABLET: 5; 325 TABLET ORAL at 11:00

## 2018-09-08 RX ADMIN — OXYCODONE HYDROCHLORIDE AND ACETAMINOPHEN 2 TABLET: 5; 325 TABLET ORAL at 01:31

## 2018-09-08 RX ADMIN — TAMSULOSIN HYDROCHLORIDE 0.4 MG: 0.4 CAPSULE ORAL at 08:28

## 2018-09-08 RX ADMIN — SODIUM CHLORIDE: 9 INJECTION, SOLUTION INTRAVENOUS at 00:46

## 2018-09-08 RX ADMIN — NIFEDIPINE 30 MG: 30 TABLET, FILM COATED, EXTENDED RELEASE ORAL at 08:28

## 2018-09-08 RX ADMIN — DOCUSATE SODIUM 100 MG: 100 CAPSULE, LIQUID FILLED ORAL at 08:28

## 2018-09-08 RX ADMIN — OXYCODONE HYDROCHLORIDE AND ACETAMINOPHEN 2 TABLET: 5; 325 TABLET ORAL at 06:36

## 2018-09-08 RX ADMIN — CEFTRIAXONE SODIUM 1 G: 1 INJECTION, POWDER, FOR SOLUTION INTRAMUSCULAR; INTRAVENOUS at 09:25

## 2018-09-08 RX ADMIN — IOTHALAMATE MEGLUMINE 30 ML: 430 INJECTION INTRAVASCULAR at 13:59

## 2018-09-08 ASSESSMENT — PAIN SCALES - GENERAL
PAINLEVEL_OUTOF10: 8

## 2018-09-08 NOTE — PROGRESS NOTES
Pts HR in the 120s. Writer notified on call resident. Order received to place pt on telemetry. Will continue to monitor.

## 2018-09-11 LAB
STONE COMPOSITION: NORMAL
STONE DESCRIPTION: NORMAL
STONE MASS: NORMAL MG
STONE NUMBER: NORMAL
STONE SIZE: NORMAL MM

## 2018-09-27 ENCOUNTER — OFFICE VISIT (OUTPATIENT)
Dept: UROLOGY | Age: 33
End: 2018-09-27
Payer: MEDICARE

## 2018-09-27 VITALS
HEART RATE: 75 BPM | WEIGHT: 155.6 LBS | SYSTOLIC BLOOD PRESSURE: 134 MMHG | HEIGHT: 65 IN | DIASTOLIC BLOOD PRESSURE: 89 MMHG | BODY MASS INDEX: 25.92 KG/M2

## 2018-09-27 DIAGNOSIS — R35.0 FREQUENCY OF MICTURITION: ICD-10-CM

## 2018-09-27 DIAGNOSIS — N20.0 RENAL CALCULUS, LEFT: Primary | ICD-10-CM

## 2018-09-27 LAB
BILIRUBIN, POC: NORMAL
BLOOD URINE, POC: NORMAL
CLARITY, POC: CLEAR
COLOR, POC: NORMAL
GLUCOSE URINE, POC: NORMAL
KETONES, POC: NORMAL
LEUKOCYTE EST, POC: NORMAL
NITRITE, POC: NORMAL
PH, POC: 7
PROTEIN, POC: 30
SPECIFIC GRAVITY, POC: 1.02
UROBILINOGEN, POC: 0.2

## 2018-09-27 PROCEDURE — 1036F TOBACCO NON-USER: CPT | Performed by: SPECIALIST

## 2018-09-27 PROCEDURE — 99212 OFFICE O/P EST SF 10 MIN: CPT

## 2018-09-27 PROCEDURE — 81003 URINALYSIS AUTO W/O SCOPE: CPT | Performed by: SPECIALIST

## 2018-09-27 PROCEDURE — G8427 DOCREV CUR MEDS BY ELIG CLIN: HCPCS | Performed by: SPECIALIST

## 2018-09-27 PROCEDURE — 99024 POSTOP FOLLOW-UP VISIT: CPT | Performed by: SPECIALIST

## 2018-09-27 PROCEDURE — G8417 CALC BMI ABV UP PARAM F/U: HCPCS | Performed by: SPECIALIST

## 2018-09-27 PROCEDURE — 1111F DSCHRG MED/CURRENT MED MERGE: CPT | Performed by: SPECIALIST

## 2018-09-27 RX ORDER — OXYBUTYNIN CHLORIDE 10 MG/1
10 TABLET, EXTENDED RELEASE ORAL DAILY
Qty: 90 TABLET | Refills: 3 | Status: SHIPPED | OUTPATIENT
Start: 2018-09-27 | End: 2020-01-16 | Stop reason: SDUPTHER

## 2018-09-27 NOTE — LETTER
[facility]    9/27/18    Patient: Paris Hernandez  YOB: 1985    Dear Thomasena Bernheim,    I had the pleasure of seeing one of your patients, Malcolm Carcamo today in the office today. Below are the relevant portions of my assessment and plan of care. IMPRESSION:   1. Renal calculus, left    2. Frequency of micturition      PLAN:   Return in about 6 months (around 3/27/2019) for KUB. Patient has recovered nicely after her 9/7/18 Left Percutaneous Nephrolithotomy. I instructed her to do a 24 hour urine collection to determine etiology of stone disease. Patient instructed to drink at least 10 eight ounce glasses of water a day and avoid excessive consumption of sodium and animal protein to decrease risk of recurrent kidney stones. Continue Oxybutynin ER 10 mg po qd for OAB symptoms. Thank you for allowing me to participate in the care of this patient. I will keep you updated on this patient's follow up and I look forward to serving you and your patients again in the future. Jacob Yoo MD, FACS

## 2018-09-27 NOTE — PROGRESS NOTES
Roberto Ward MD, [facility] Progress Note      Patient:  Nara Michael  YOB: 1985  Date: 9/27/2018    HISTORY OF PRESENT ILLNESS:   The patient is a 35 y.o. female who presents today for follow-up for the following problem(s): kidney stone   Overall the problem(s) : are improving. Associated Symptoms: No dysuria, gross hematuria. Pain Severity: Pain Score:   7    Summary of old records:   History of BL kidney stones s/p BL urs and HLL 10/13/16, and BL stents finally removed 10/20/16; 9/7/18 L PCNL, 10% Ca oxal, 90% Ca phos   3/9/17 KUB punctate left KS  Mixed incontinence, urge > stress; increase oxybutynin ER 10 mg qd; 2 ppd    Additional History: Patient has recovered nicely after her 9/7/18 Left Percutaneous Nephrolithotomy. I instructed her to do a 24 hour urine collection to determine etiology of stone disease. Continue Oxybutynin ER 10 mg po qd for OAB symptoms.      Imaging Reviewed during this Office Visit:   (results were independently reviewed by physician and radiology report verified)    Urinalysis today:  Results for POC orders placed in visit on 09/27/18   POCT Urinalysis No Micro (Auto)   Result Value Ref Range    Color, UA dark yellow     Clarity, UA clear     Glucose, UA POC neg     Bilirubin, UA neg     Ketones, UA neg     Spec Grav, UA 1.020     Blood, UA POC trace-lysed     pH, UA 7.0     Protein, UA POC 30     Urobilinogen, UA 0.2     Leukocytes, UA moderate     Nitrite, UA neg        Last BUN and creatinine:  Lab Results   Component Value Date    BUN 5 (L) 09/08/2018     Lab Results   Component Value Date    CREATININE 0.62 09/08/2018       PAST MEDICAL, FAMILY AND SOCIAL HISTORY UPDATE:  Past Medical History:   Diagnosis Date    Abnormal Pap smear of cervix 2003    at 25years old    Blood in urine 2016    WITH BLOOD CLOTS    Difficult intravenous access     [address] [facility] TO SEE REQUEST IV BE PUT IN HAND    Fall 2016

## 2019-02-11 ENCOUNTER — HOSPITAL ENCOUNTER (EMERGENCY)
Age: 34
Discharge: HOME | End: 2019-02-11
Attending: EMERGENCY MEDICINE
Payer: MEDICARE

## 2019-02-11 VITALS
RESPIRATION RATE: 17 BRPM | DIASTOLIC BLOOD PRESSURE: 99 MMHG | OXYGEN SATURATION: 99 % | SYSTOLIC BLOOD PRESSURE: 169 MMHG | TEMPERATURE: 98.4 F | HEART RATE: 79 BPM

## 2019-02-11 DIAGNOSIS — L98.9 SKIN LESION: Primary | ICD-10-CM

## 2019-02-11 PROCEDURE — 99282 EMERGENCY DEPT VISIT SF MDM: CPT

## 2019-02-11 RX ORDER — MUPIROCIN CALCIUM 20 MG/G
CREAM TOPICAL
Qty: 1 TUBE | Refills: 0 | Status: SHIPPED | OUTPATIENT
Start: 2019-02-11 | End: 2019-03-13

## 2019-02-11 ASSESSMENT — ENCOUNTER SYMPTOMS
ABDOMINAL PAIN: 0
DIARRHEA: 0
VOMITING: 0
COLOR CHANGE: 0
COUGH: 0
SHORTNESS OF BREATH: 0
BACK PAIN: 0
NAUSEA: 0
SORE THROAT: 0

## 2019-02-11 ASSESSMENT — PAIN DESCRIPTION - ORIENTATION: ORIENTATION: MID;LOWER

## 2019-02-11 ASSESSMENT — PAIN DESCRIPTION - LOCATION: LOCATION: BACK

## 2019-02-11 ASSESSMENT — PAIN DESCRIPTION - DESCRIPTORS: DESCRIPTORS: SORE

## 2019-02-11 ASSESSMENT — PAIN SCALES - GENERAL: PAINLEVEL_OUTOF10: 10

## 2019-04-11 ENCOUNTER — OFFICE VISIT (OUTPATIENT)
Dept: UROLOGY | Age: 34
End: 2019-04-11
Payer: MEDICARE

## 2019-04-11 VITALS
HEIGHT: 65 IN | DIASTOLIC BLOOD PRESSURE: 96 MMHG | HEART RATE: 106 BPM | SYSTOLIC BLOOD PRESSURE: 148 MMHG | BODY MASS INDEX: 29.99 KG/M2 | WEIGHT: 180 LBS

## 2019-04-11 DIAGNOSIS — N20.0 RENAL CALCULUS, LEFT: ICD-10-CM

## 2019-04-11 DIAGNOSIS — N39.46 MIXED INCONTINENCE: Primary | ICD-10-CM

## 2019-04-11 LAB
BILIRUBIN, POC: ABNORMAL
BLOOD URINE, POC: ABNORMAL
CLARITY, POC: ABNORMAL
COLOR, POC: YELLOW
GLUCOSE URINE, POC: ABNORMAL
KETONES, POC: ABNORMAL
LEUKOCYTE EST, POC: ABNORMAL
NITRITE, POC: ABNORMAL
PH, POC: 6.5
PROTEIN, POC: ABNORMAL
SPECIFIC GRAVITY, POC: 1.01
UROBILINOGEN, POC: 0.2

## 2019-04-11 PROCEDURE — 99213 OFFICE O/P EST LOW 20 MIN: CPT | Performed by: SPECIALIST

## 2019-04-11 PROCEDURE — G8427 DOCREV CUR MEDS BY ELIG CLIN: HCPCS | Performed by: SPECIALIST

## 2019-04-11 PROCEDURE — 1036F TOBACCO NON-USER: CPT | Performed by: SPECIALIST

## 2019-04-11 PROCEDURE — 81002 URINALYSIS NONAUTO W/O SCOPE: CPT | Performed by: SPECIALIST

## 2019-04-11 PROCEDURE — G8417 CALC BMI ABV UP PARAM F/U: HCPCS | Performed by: SPECIALIST

## 2019-04-11 RX ORDER — HYDROCODONE BITARTRATE AND ACETAMINOPHEN 5; 325 MG/1; MG/1
1 TABLET ORAL
COMMUNITY
Start: 2019-04-08 | End: 2019-04-15

## 2019-04-11 RX ORDER — LISINOPRIL 5 MG/1
5 TABLET ORAL
COMMUNITY
Start: 2019-02-08 | End: 2020-01-16 | Stop reason: CLARIF

## 2019-04-11 NOTE — PROGRESS NOTES
Trent Crowder MD, [facility]  [facility] [facility] Progress Note      Patient:  Alejandra Lawson  YOB: 1985  Date: 4/11/2019    Kidney calculus:  Patient is here today for a history of kidney calculus which was first noted several year(s) ago. Current medical Rx for renal calculus: none. Passed a recent calculus? No  Flank pain? no  Hematuria? none    Lab Results   Component Value Date    CALCIUM 8.2 (L) 09/08/2018     Lab Results   Component Value Date     09/08/2018    K 3.9 09/08/2018     09/08/2018    CO2 24 09/08/2018     Pain Severity: Pain Score:  10 - Worst pain ever    Summary of old records:   History of BL kidney stones s/p BL urs and HLL 10/13/16, and BL stents finally removed 10/20/16; 9/7/18 L PCNL, 10% Ca oxal, 90% Ca phos   3/9/17 KUB punctate left KS  Mixed incontinence, urge > stress; increase oxybutynin ER 10 mg qd; 2 ppd    Additional History: The patient presents with a history of kidney stones. She did not get her KUB or 24 hour urine as requested. Continue Oxybutynin ER 10 mg po qd for OAB symptoms.      Imaging Reviewed during this Office Visit:   (results were independently reviewed by physician and radiology report verified)    Urinalysis today:  Results for POC orders placed in visit on 04/11/19   POCT Urinalysis no Micro   Result Value Ref Range    Color, UA yellow     Clarity, UA cloudy     Glucose, UA POC neg     Bilirubin, UA neg     Ketones, UA neg     Spec Grav, UA 1.015     Blood, UA POC moderate     pH, UA 6.5     Protein, UA POC 100mg/dl     Urobilinogen, UA 0.2     Leukocytes, UA small     Nitrite, UA pos        Last BUN and creatinine:  Lab Results   Component Value Date    BUN 5 (L) 09/08/2018     Lab Results   Component Value Date    CREATININE 0.62 09/08/2018       PAST MEDICAL, FAMILY AND SOCIAL HISTORY UPDATE:  Past Medical History:   Diagnosis Date    Abnormal Pap smear of cervix 2003    at 25years old    Blood in urine 2016    WITH Paternal Grandmother     High Blood Pressure Paternal Grandmother     Diabetes Mother     High Blood Pressure Mother     Heart Disease Father         CAD CARDIAC STENTS    High Blood Pressure Father     Asthma Maternal Grandmother     Diabetes Maternal Grandmother     Heart Disease Maternal Grandmother         MILD MI    High Blood Pressure Maternal Grandmother     Cancer Maternal Grandfather         LEUKEMIA    High Blood Pressure Maternal Grandfather     High Blood Pressure Paternal Grandfather      Outpatient Medications Marked as Taking for the 19 encounter (Office Visit) with Jj Darnell MD   Medication Sig Dispense Refill    HYDROcodone-acetaminophen (NORCO) 5-325 MG per tablet Take 1 tablet by mouth.       lisinopril (PRINIVIL;ZESTRIL) 5 MG tablet Take 5 mg by mouth      oxybutynin (DITROPAN-XL) 10 MG extended release tablet Take 1 tablet by mouth daily 90 tablet 3    acetaminophen (TYLENOL) 325 MG tablet Take 650 mg by mouth every 6 hours as needed for Pain      NIFEdipine (PROCARDIA XL) 30 MG extended release tablet Take 30 mg by mouth daily      GAS RELIEF 80 MG chewable tablet Take 80 mg by mouth as needed         Levaquin [levofloxacin in d5w]  Social History     Tobacco Use   Smoking Status Former Smoker    Last attempt to quit: 2005    Years since quittin.7   Smokeless Tobacco Never Used       Social History     Substance and Sexual Activity   Alcohol Use No    Alcohol/week: 0.0 oz       REVIEW OF SYSTEMS (obtained by ancillary medical staff, reviewed by physician and agree):  Constitutional: positive for  fevers and chills  Eyes: positive for  double vision and blurred vision  Respiratory: positive for  Shortness of breath  Cardiovascular: negative  Gastrointestinal: Positive for nausea and vomiting  negative  Genitourinary: positive for frequency, dysuria and vaginal pain, dysmenorrhea and abdominal pain  Musculoskeletal: positive for  myalgias and joint swelling  Skin: negative   Neurological: positive for dizziness  Hematological/Lymphatic: positive for easy bruising  Psychological: negative    Physical Exam:      Vitals:    04/11/19 1034   BP: (!) 148/96   Pulse: 106     Patient alert and oriented and in no apparent distress. Assessment and Plan      1. Mixed incontinence    2. Renal calculus, left           Plan:      Return on she completes her 24 hour urine and get KUB. The patient presents with a history of kidney stones. he did not get her KUB or 24 hour urine as requested. Continue Oxybutynin ER 10 mg po qd for OAB symptoms. I have discussed the care of this patient including pertinent history and exam findings, with the resident. I have seen and examined the patient and the key elements of all parts of the encounter have been performed by me. I agree with the assessment, plan and orders as documented by the resident. Lyle Mansfield MD, FACS

## 2019-12-17 DIAGNOSIS — N20.0 RENAL CALCULUS, LEFT: Primary | ICD-10-CM

## 2019-12-17 DIAGNOSIS — R35.0 FREQUENCY OF MICTURITION: ICD-10-CM

## 2019-12-20 ENCOUNTER — HOSPITAL ENCOUNTER (OUTPATIENT)
Age: 34
Discharge: HOME | End: 2019-12-20
Payer: MEDICARE

## 2019-12-20 DIAGNOSIS — R35.0 FREQUENCY OF MICTURITION: ICD-10-CM

## 2019-12-20 LAB
-: ABNORMAL
AMORPHOUS: ABNORMAL
BACTERIA: ABNORMAL
BILIRUBIN URINE: NEGATIVE
CASTS UA: ABNORMAL /LPF (ref 0–2)
COLOR: YELLOW
CRYSTALS, UA: ABNORMAL /HPF
CRYSTALS, UA: ABNORMAL /HPF
EPITHELIAL CELLS UA: ABNORMAL /HPF (ref 0–5)
GLUCOSE URINE: NEGATIVE
KETONES, URINE: ABNORMAL
LEUKOCYTE ESTERASE, URINE: ABNORMAL
MUCUS: ABNORMAL
NITRITE, URINE: NEGATIVE
OTHER OBSERVATIONS UA: ABNORMAL
PH UA: 5 (ref 5–8)
PROTEIN UA: ABNORMAL
RBC UA: ABNORMAL /HPF (ref 0–2)
RENAL EPITHELIAL, UA: ABNORMAL /HPF
SPECIFIC GRAVITY UA: 1.03 (ref 1–1.03)
TRICHOMONAS: ABNORMAL
TURBIDITY: ABNORMAL
URINE HGB: NEGATIVE
UROBILINOGEN, URINE: NORMAL
WBC UA: ABNORMAL /HPF (ref 0–5)
YEAST: ABNORMAL

## 2019-12-20 PROCEDURE — 81001 URINALYSIS AUTO W/SCOPE: CPT

## 2019-12-30 ENCOUNTER — TELEPHONE (OUTPATIENT)
Dept: UROLOGY | Age: 34
End: 2019-12-30

## 2019-12-30 DIAGNOSIS — R35.0 FREQUENCY OF MICTURITION: Primary | ICD-10-CM

## 2019-12-30 NOTE — TELEPHONE ENCOUNTER
Pt called and is asking for results of her ua  12/20/2019  2:58 PM - Tony Diaz Incoming Lab Results From [facility]     Component Value Ref Range & Units Status Collected Lab   Color, UA YELLOW  YELLOW Final 12/20/2019  2:39  [address]   Turbidity UA CLOUDYAbnormal   CLEAR Final 12/20/2019  2:39  [address]   Glucose, Ur NEGATIVE  NEGATIVE Final 12/20/2019  2:39  [address]   Bilirubin Urine NEGATIVE  NEGATIVE Final 12/20/2019  2:39  [address]   Ketones, Urine TRACEAbnormal   NEGATIVE Final 12/20/2019  2:39  [address]   Specific [place], UA 1.026  1.005 - 1.030 Final 12/20/2019  2:39  [address]   Urine Hgb NEGATIVE  NEGATIVE Final 12/20/2019  2:39  [address]   pH, UA 5.0  5.0 - 8.0 Final 12/20/2019  2:39  [address]   Protein, [place] 1+Abnormal   NEGATIVE Final 12/20/2019  2:39  Ashlee Dawn, Urine Normal  Normal Final 12/20/2019  2:39  [address]   Nitrite, Urine NEGATIVE  NEGATIVE Final 12/20/2019  2:39  [address]   Leukocyte Esterase, Urine MODERATEAbnormal   NEGATIVE Final 12/20/2019  2:39 PM [facility]     Final 12/20/2019  2:39  [address]   WBC, UA 20 TO 50  0 - 5 /HPF Final 12/20/2019  2:39  [address]   RBC, UA None  0 - 2 /HPF Final 12/20/2019  2:39  [address]   Casts UA NOT REPORTED  0 - 2 /LPF Final 12/20/2019  2:39  [address]   Crystals UA CALCIUM OXALATEAbnormal   None /HPF Final 12/20/2019  2:39  [address]   Crystals UA FEWAbnormal   None /HPF Final 12/20/2019  2:39  [address]   Epithelial Cells UA 2 TO 5  0 - 5 /HPF Final 12/20/2019  2:39  [address]   Renal Epithelial, Urine NOT REPORTED  0 /HPF Final 12/20/2019  2:39 PM

## 2019-12-30 NOTE — TELEPHONE ENCOUNTER
Patient's UA shows some white blood cells in urine but this may represent contamination. If patient having symptoms of infection, she will need to get a Urine sent for C&S to rule out infection.

## 2020-01-02 ENCOUNTER — HOSPITAL ENCOUNTER (OUTPATIENT)
Age: 35
Discharge: HOME | End: 2020-01-02
Payer: MEDICARE

## 2020-01-02 PROCEDURE — 87186 SC STD MICRODIL/AGAR DIL: CPT

## 2020-01-02 PROCEDURE — 87086 URINE CULTURE/COLONY COUNT: CPT

## 2020-01-02 PROCEDURE — 87077 CULTURE AEROBIC IDENTIFY: CPT

## 2020-01-05 LAB
CULTURE: ABNORMAL
Lab: ABNORMAL
SPECIMEN DESCRIPTION: ABNORMAL

## 2020-01-15 ENCOUNTER — HOSPITAL ENCOUNTER (OUTPATIENT)
Age: 35
Discharge: HOME | End: 2020-01-17
Payer: MEDICARE

## 2020-01-15 ENCOUNTER — HOSPITAL ENCOUNTER (OUTPATIENT)
Dept: GENERAL RADIOLOGY | Age: 35
Discharge: HOME | End: 2020-01-17
Payer: MEDICARE

## 2020-01-15 PROCEDURE — 74018 RADEX ABDOMEN 1 VIEW: CPT

## 2020-01-16 ENCOUNTER — HOSPITAL ENCOUNTER (OUTPATIENT)
Age: 35
Setting detail: SPECIMEN
Discharge: HOME | End: 2020-01-16
Payer: MEDICARE

## 2020-01-16 ENCOUNTER — OFFICE VISIT (OUTPATIENT)
Dept: UROLOGY | Age: 35
End: 2020-01-16
Payer: MEDICARE

## 2020-01-16 VITALS
BODY MASS INDEX: 25.95 KG/M2 | WEIGHT: 152 LBS | HEIGHT: 64 IN | SYSTOLIC BLOOD PRESSURE: 126 MMHG | DIASTOLIC BLOOD PRESSURE: 87 MMHG | HEART RATE: 96 BPM

## 2020-01-16 PROBLEM — R35.0 FREQUENCY OF MICTURITION: Status: ACTIVE | Noted: 2020-01-16

## 2020-01-16 LAB
BILIRUBIN, POC: NEGATIVE
BLOOD URINE, POC: ABNORMAL
CLARITY, POC: CLEAR
COLOR, POC: YELLOW
GLUCOSE URINE, POC: NEGATIVE
KETONES, POC: NEGATIVE
LEUKOCYTE EST, POC: ABNORMAL
NITRITE, POC: POSITIVE
PH, POC: 6.5
PROTEIN, POC: 30
SPECIFIC GRAVITY, POC: 1.02
UROBILINOGEN, POC: 0.2

## 2020-01-16 PROCEDURE — 1036F TOBACCO NON-USER: CPT | Performed by: SPECIALIST

## 2020-01-16 PROCEDURE — G8484 FLU IMMUNIZE NO ADMIN: HCPCS | Performed by: SPECIALIST

## 2020-01-16 PROCEDURE — G8417 CALC BMI ABV UP PARAM F/U: HCPCS | Performed by: SPECIALIST

## 2020-01-16 PROCEDURE — 99212 OFFICE O/P EST SF 10 MIN: CPT

## 2020-01-16 PROCEDURE — 99214 OFFICE O/P EST MOD 30 MIN: CPT | Performed by: SPECIALIST

## 2020-01-16 PROCEDURE — 81003 URINALYSIS AUTO W/O SCOPE: CPT | Performed by: SPECIALIST

## 2020-01-16 PROCEDURE — G8427 DOCREV CUR MEDS BY ELIG CLIN: HCPCS | Performed by: SPECIALIST

## 2020-01-16 RX ORDER — LOSARTAN POTASSIUM 50 MG/1
50 TABLET ORAL DAILY
COMMUNITY
Start: 2019-12-06

## 2020-01-16 RX ORDER — OXYBUTYNIN CHLORIDE 10 MG/1
10 TABLET, EXTENDED RELEASE ORAL DAILY
Qty: 90 TABLET | Refills: 3 | Status: SHIPPED | OUTPATIENT
Start: 2020-01-16 | End: 2020-01-16 | Stop reason: SDUPTHER

## 2020-01-16 RX ORDER — OXYBUTYNIN CHLORIDE 10 MG/1
10 TABLET, EXTENDED RELEASE ORAL 2 TIMES DAILY
Qty: 60 TABLET | Refills: 11 | Status: ON HOLD | OUTPATIENT
Start: 2020-01-16 | End: 2020-01-23 | Stop reason: SDUPTHER

## 2020-01-16 NOTE — PROGRESS NOTES
Taj Savage MD, [facility]  [facility] Progress Note      Patient:  Mary Arevalo  YOB: 1985  Date: 1/16/2020    Kidney calculus:  Patient is here today for a kidney calculus which was first noted several year(s) ago. Location: Bilateral upper and lower pole  Size: 5-13 mm  Current medical Rx for renal calculus: none  Passed recent calculus? No  Stone composition: see below   Flank pain? no  Hematuria? microscopic    Lab Results   Component Value Date    CALCIUM 8.2 (L) 09/08/2018     Lab Results   Component Value Date     09/08/2018    K 3.9 09/08/2018     09/08/2018    CO2 24 09/08/2018      Pain Severity: Pain Score:   7(flank pain)    Summary of old records:   Bilateral kidney stones s/p BL urs and HLL 10/13/16, and BL stents finally removed 10/20/16; 9/7/18 L PCNL, 10% Ca oxal, 90% Ca phos; 1/15/20 KUB: L=13mm, R=5mm   3/9/17 KUB punctate left KS  Mixed incontinence, urge > stress; increase oxybutynin ER 10 mg bid; 2 ppd    Additional History: The patient presents with L>R kidney stones on 1/15/20 KUB. Urine sent for C&S to rule out infection. Will proceed with Cystoscopy, Left ureteroscopy and Holmium laser lithotripsy with ureteral stent insertion under GA in the near future. Patient using Oxybutynin ER 10 mg po bid for OAB symptoms.      Imaging Reviewed during this Office Visit:   (results were independently reviewed by physician and radiology report verified)    Urinalysis today:  Results for POC orders placed in visit on 01/16/20   POCT Urinalysis No Micro (Auto)   Result Value Ref Range    Color, UA yellow     Clarity, UA clear     Glucose, UA POC negative     Bilirubin, UA negative     Ketones, UA negative     Spec Grav, UA 1.025     Blood, UA POC MODERATE     pH, UA 6.5     Protein, UA POC 30     Urobilinogen, UA 0.2     Leukocytes, UA SMALL     Nitrite, UA POSITIVE        Last BUN and creatinine:  Lab Results   Component Value Date    BUN 5 (L) PERCUTANEOUS, C-ARM, LITHOCLAST, PT COMING FROM INTERV RADIOLOGY performed by Michelle Hoang MD at [address] [facility]       Family History   Problem Relation Age of Onset    Heart Attack Paternal Aunt     Heart Attack Paternal Grandmother     High Blood Pressure Paternal Grandmother     Diabetes Mother     High Blood Pressure Mother     Heart Disease Father         CAD CARDIAC STENTS    High Blood Pressure Father     Asthma Maternal Grandmother     Diabetes Maternal Grandmother     Heart Disease Maternal Grandmother         MILD MI    High Blood Pressure Maternal Grandmother     Cancer Maternal Grandfather         LEUKEMIA    High Blood Pressure Maternal Grandfather     High Blood Pressure Paternal Grandfather      Outpatient Medications Marked as Taking for the 20 encounter (Office Visit) with Michelle Hoang MD   Medication Sig Dispense Refill    losartan (COZAAR) 50 MG tablet       oxybutynin (DITROPAN-XL) 10 MG extended release tablet Take 1 tablet by mouth 2 times daily 60 tablet 11    acetaminophen (TYLENOL) 325 MG tablet Take 650 mg by mouth every 6 hours as needed for Pain      NIFEdipine (PROCARDIA XL) 30 MG extended release tablet Take 30 mg by mouth daily      ondansetron (ZOFRAN-ODT) 8 MG TBDP disintegrating tablet Take 8 mg by mouth every 8 hours as needed      GAS RELIEF 80 MG chewable tablet Take 80 mg by mouth as needed         Levaquin [levofloxacin in d5w]  Social History     Tobacco Use   Smoking Status Former Smoker    Last attempt to quit: 2005    Years since quittin.4   Smokeless Tobacco Never Used       Social History     Substance and Sexual Activity   Alcohol Use No    Alcohol/week: 0.0 standard drinks       REVIEW OF SYSTEMS (obtained by ancillary medical staff, reviewed by physician and agree):  Constitutional: chills  Eyes: negative  Respiratory: negative  Cardiovascular: negative  Gastrointestinal:

## 2020-01-19 LAB
CULTURE: ABNORMAL
Lab: ABNORMAL
SPECIMEN DESCRIPTION: ABNORMAL

## 2020-01-20 ENCOUNTER — TELEPHONE (OUTPATIENT)
Dept: UROLOGY | Age: 35
End: 2020-01-20

## 2020-01-23 ENCOUNTER — HOSPITAL ENCOUNTER (OUTPATIENT)
Age: 35
Setting detail: OUTPATIENT SURGERY
Discharge: HOME | End: 2020-01-23
Attending: SPECIALIST | Admitting: SPECIALIST
Payer: MEDICARE

## 2020-01-23 ENCOUNTER — ANESTHESIA EVENT (OUTPATIENT)
Dept: OPERATING ROOM | Age: 35
End: 2020-01-23
Payer: MEDICARE

## 2020-01-23 ENCOUNTER — ANESTHESIA (OUTPATIENT)
Dept: OPERATING ROOM | Age: 35
End: 2020-01-23
Payer: MEDICARE

## 2020-01-23 ENCOUNTER — APPOINTMENT (OUTPATIENT)
Dept: GENERAL RADIOLOGY | Age: 35
End: 2020-01-23
Attending: SPECIALIST
Payer: MEDICARE

## 2020-01-23 VITALS
HEIGHT: 65 IN | DIASTOLIC BLOOD PRESSURE: 93 MMHG | TEMPERATURE: 97.2 F | SYSTOLIC BLOOD PRESSURE: 159 MMHG | BODY MASS INDEX: 25.83 KG/M2 | RESPIRATION RATE: 15 BRPM | OXYGEN SATURATION: 98 % | WEIGHT: 155 LBS | HEART RATE: 92 BPM

## 2020-01-23 VITALS — TEMPERATURE: 95.4 F | SYSTOLIC BLOOD PRESSURE: 138 MMHG | OXYGEN SATURATION: 99 % | DIASTOLIC BLOOD PRESSURE: 110 MMHG

## 2020-01-23 LAB — HCG, PREGNANCY URINE (POC): NEGATIVE

## 2020-01-23 PROCEDURE — C2617 STENT, NON-COR, TEM W/O DEL: HCPCS | Performed by: SPECIALIST

## 2020-01-23 PROCEDURE — 2580000003 HC RX 258: Performed by: ANESTHESIOLOGY

## 2020-01-23 PROCEDURE — 7100000000 HC PACU RECOVERY - FIRST 15 MIN: Performed by: SPECIALIST

## 2020-01-23 PROCEDURE — 6360000002 HC RX W HCPCS: Performed by: STUDENT IN AN ORGANIZED HEALTH CARE EDUCATION/TRAINING PROGRAM

## 2020-01-23 PROCEDURE — 81025 URINE PREGNANCY TEST: CPT

## 2020-01-23 PROCEDURE — 7100000001 HC PACU RECOVERY - ADDTL 15 MIN: Performed by: SPECIALIST

## 2020-01-23 PROCEDURE — 3600000014 HC SURGERY LEVEL 4 ADDTL 15MIN: Performed by: SPECIALIST

## 2020-01-23 PROCEDURE — 6360000002 HC RX W HCPCS: Performed by: ANESTHESIOLOGY

## 2020-01-23 PROCEDURE — 6360000002 HC RX W HCPCS: Performed by: NURSE ANESTHETIST, CERTIFIED REGISTERED

## 2020-01-23 PROCEDURE — 3600000004 HC SURGERY LEVEL 4 BASE: Performed by: SPECIALIST

## 2020-01-23 PROCEDURE — 2720000010 HC SURG SUPPLY STERILE: Performed by: SPECIALIST

## 2020-01-23 PROCEDURE — 6370000000 HC RX 637 (ALT 250 FOR IP): Performed by: ANESTHESIOLOGY

## 2020-01-23 PROCEDURE — 3700000001 HC ADD 15 MINUTES (ANESTHESIA): Performed by: SPECIALIST

## 2020-01-23 PROCEDURE — 2500000003 HC RX 250 WO HCPCS: Performed by: NURSE ANESTHETIST, CERTIFIED REGISTERED

## 2020-01-23 PROCEDURE — 74018 RADEX ABDOMEN 1 VIEW: CPT

## 2020-01-23 PROCEDURE — 2500000003 HC RX 250 WO HCPCS: Performed by: ANESTHESIOLOGY

## 2020-01-23 PROCEDURE — 3700000000 HC ANESTHESIA ATTENDED CARE: Performed by: SPECIALIST

## 2020-01-23 PROCEDURE — 2580000003 HC RX 258: Performed by: STUDENT IN AN ORGANIZED HEALTH CARE EDUCATION/TRAINING PROGRAM

## 2020-01-23 PROCEDURE — 2709999900 HC NON-CHARGEABLE SUPPLY: Performed by: SPECIALIST

## 2020-01-23 PROCEDURE — C1769 GUIDE WIRE: HCPCS | Performed by: SPECIALIST

## 2020-01-23 PROCEDURE — 7100000010 HC PHASE II RECOVERY - FIRST 15 MIN: Performed by: SPECIALIST

## 2020-01-23 DEVICE — URETERAL STENT
Type: IMPLANTABLE DEVICE | Site: URETER | Status: FUNCTIONAL
Brand: CONTOUR™

## 2020-01-23 RX ORDER — FENTANYL CITRATE 50 UG/ML
50 INJECTION, SOLUTION INTRAMUSCULAR; INTRAVENOUS EVERY 5 MIN PRN
Status: DISCONTINUED | OUTPATIENT
Start: 2020-01-23 | End: 2020-01-23 | Stop reason: HOSPADM

## 2020-01-23 RX ORDER — FENTANYL CITRATE 50 UG/ML
25 INJECTION, SOLUTION INTRAMUSCULAR; INTRAVENOUS EVERY 5 MIN PRN
Status: DISCONTINUED | OUTPATIENT
Start: 2020-01-23 | End: 2020-01-23 | Stop reason: HOSPADM

## 2020-01-23 RX ORDER — DOCUSATE SODIUM 100 MG/1
100 CAPSULE, LIQUID FILLED ORAL 2 TIMES DAILY
Qty: 60 CAPSULE | Refills: 0 | Status: SHIPPED | OUTPATIENT
Start: 2020-01-23 | End: 2020-02-22

## 2020-01-23 RX ORDER — DIPHENHYDRAMINE HYDROCHLORIDE 50 MG/ML
12.5 INJECTION INTRAMUSCULAR; INTRAVENOUS
Status: DISCONTINUED | OUTPATIENT
Start: 2020-01-23 | End: 2020-01-23 | Stop reason: HOSPADM

## 2020-01-23 RX ORDER — OXYCODONE HYDROCHLORIDE AND ACETAMINOPHEN 5; 325 MG/1; MG/1
1 TABLET ORAL EVERY 4 HOURS PRN
Status: DISCONTINUED | OUTPATIENT
Start: 2020-01-23 | End: 2020-01-23 | Stop reason: HOSPADM

## 2020-01-23 RX ORDER — TAMSULOSIN HYDROCHLORIDE 0.4 MG/1
0.4 CAPSULE ORAL DAILY
Qty: 30 CAPSULE | Refills: 0 | Status: SHIPPED | OUTPATIENT
Start: 2020-01-23 | End: 2020-02-17 | Stop reason: SDUPTHER

## 2020-01-23 RX ORDER — MIDAZOLAM HYDROCHLORIDE 1 MG/ML
2 INJECTION INTRAMUSCULAR; INTRAVENOUS ONCE
Status: COMPLETED | OUTPATIENT
Start: 2020-01-23 | End: 2020-01-23

## 2020-01-23 RX ORDER — LIDOCAINE HYDROCHLORIDE 10 MG/ML
INJECTION, SOLUTION EPIDURAL; INFILTRATION; INTRACAUDAL; PERINEURAL PRN
Status: DISCONTINUED | OUTPATIENT
Start: 2020-01-23 | End: 2020-01-23 | Stop reason: SDUPTHER

## 2020-01-23 RX ORDER — 0.9 % SODIUM CHLORIDE 0.9 %
500 INTRAVENOUS SOLUTION INTRAVENOUS
Status: DISCONTINUED | OUTPATIENT
Start: 2020-01-23 | End: 2020-01-23 | Stop reason: HOSPADM

## 2020-01-23 RX ORDER — PROMETHAZINE HYDROCHLORIDE 25 MG/ML
6.25 INJECTION, SOLUTION INTRAMUSCULAR; INTRAVENOUS
Status: DISCONTINUED | OUTPATIENT
Start: 2020-01-23 | End: 2020-01-23 | Stop reason: HOSPADM

## 2020-01-23 RX ORDER — OXYCODONE HYDROCHLORIDE AND ACETAMINOPHEN 5; 325 MG/1; MG/1
1 TABLET ORAL EVERY 6 HOURS PRN
Qty: 20 TABLET | Refills: 0 | Status: SHIPPED | OUTPATIENT
Start: 2020-01-23 | End: 2020-01-23 | Stop reason: SDUPTHER

## 2020-01-23 RX ORDER — HYDRALAZINE HYDROCHLORIDE 20 MG/ML
5 INJECTION INTRAMUSCULAR; INTRAVENOUS EVERY 10 MIN PRN
Status: DISCONTINUED | OUTPATIENT
Start: 2020-01-23 | End: 2020-01-23 | Stop reason: HOSPADM

## 2020-01-23 RX ORDER — OXYBUTYNIN CHLORIDE 10 MG/1
15 TABLET, EXTENDED RELEASE ORAL 2 TIMES DAILY
Qty: 10 TABLET | Refills: 0 | Status: SHIPPED | OUTPATIENT
Start: 2020-01-23

## 2020-01-23 RX ORDER — ONDANSETRON 2 MG/ML
INJECTION INTRAMUSCULAR; INTRAVENOUS PRN
Status: DISCONTINUED | OUTPATIENT
Start: 2020-01-23 | End: 2020-01-23 | Stop reason: SDUPTHER

## 2020-01-23 RX ORDER — DEXAMETHASONE SODIUM PHOSPHATE 10 MG/ML
INJECTION INTRAMUSCULAR; INTRAVENOUS PRN
Status: DISCONTINUED | OUTPATIENT
Start: 2020-01-23 | End: 2020-01-23 | Stop reason: SDUPTHER

## 2020-01-23 RX ORDER — ONDANSETRON 2 MG/ML
4 INJECTION INTRAMUSCULAR; INTRAVENOUS
Status: COMPLETED | OUTPATIENT
Start: 2020-01-23 | End: 2020-01-23

## 2020-01-23 RX ORDER — GLYCOPYRROLATE 1 MG/5 ML
SYRINGE (ML) INTRAVENOUS PRN
Status: DISCONTINUED | OUTPATIENT
Start: 2020-01-23 | End: 2020-01-23 | Stop reason: SDUPTHER

## 2020-01-23 RX ORDER — SCOLOPAMINE TRANSDERMAL SYSTEM 1 MG/1
1 PATCH, EXTENDED RELEASE TRANSDERMAL ONCE
Status: DISCONTINUED | OUTPATIENT
Start: 2020-01-23 | End: 2020-01-23 | Stop reason: HOSPADM

## 2020-01-23 RX ORDER — SODIUM CHLORIDE, SODIUM LACTATE, POTASSIUM CHLORIDE, CALCIUM CHLORIDE 600; 310; 30; 20 MG/100ML; MG/100ML; MG/100ML; MG/100ML
INJECTION, SOLUTION INTRAVENOUS CONTINUOUS
Status: DISCONTINUED | OUTPATIENT
Start: 2020-01-23 | End: 2020-01-23 | Stop reason: HOSPADM

## 2020-01-23 RX ORDER — FENTANYL CITRATE 50 UG/ML
INJECTION, SOLUTION INTRAMUSCULAR; INTRAVENOUS PRN
Status: DISCONTINUED | OUTPATIENT
Start: 2020-01-23 | End: 2020-01-23 | Stop reason: SDUPTHER

## 2020-01-23 RX ORDER — TAMSULOSIN HYDROCHLORIDE 0.4 MG/1
0.4 CAPSULE ORAL DAILY
Qty: 30 CAPSULE | Refills: 0 | Status: SHIPPED | OUTPATIENT
Start: 2020-01-23 | End: 2020-01-23 | Stop reason: SDUPTHER

## 2020-01-23 RX ORDER — LABETALOL HYDROCHLORIDE 5 MG/ML
5 INJECTION, SOLUTION INTRAVENOUS EVERY 10 MIN PRN
Status: DISCONTINUED | OUTPATIENT
Start: 2020-01-23 | End: 2020-01-23 | Stop reason: HOSPADM

## 2020-01-23 RX ORDER — OXYCODONE HYDROCHLORIDE AND ACETAMINOPHEN 5; 325 MG/1; MG/1
1 TABLET ORAL EVERY 6 HOURS PRN
Qty: 16 TABLET | Refills: 0 | Status: SHIPPED | OUTPATIENT
Start: 2020-01-23 | End: 2020-01-28

## 2020-01-23 RX ORDER — PROPOFOL 10 MG/ML
INJECTION, EMULSION INTRAVENOUS PRN
Status: DISCONTINUED | OUTPATIENT
Start: 2020-01-23 | End: 2020-01-23 | Stop reason: SDUPTHER

## 2020-01-23 RX ADMIN — Medication 0.2 MG: at 15:39

## 2020-01-23 RX ADMIN — FENTANYL CITRATE 50 MCG: 50 INJECTION, SOLUTION INTRAMUSCULAR; INTRAVENOUS at 16:34

## 2020-01-23 RX ADMIN — MIDAZOLAM HYDROCHLORIDE 2 MG: 1 INJECTION, SOLUTION INTRAMUSCULAR; INTRAVENOUS at 12:31

## 2020-01-23 RX ADMIN — DEXAMETHASONE SODIUM PHOSPHATE 10 MG: 10 INJECTION INTRAMUSCULAR; INTRAVENOUS at 15:30

## 2020-01-23 RX ADMIN — Medication 5 MG: at 17:37

## 2020-01-23 RX ADMIN — OXYCODONE HYDROCHLORIDE AND ACETAMINOPHEN 1 TABLET: 5; 325 TABLET ORAL at 17:36

## 2020-01-23 RX ADMIN — FENTANYL CITRATE 50 MCG: 50 INJECTION INTRAMUSCULAR; INTRAVENOUS at 15:31

## 2020-01-23 RX ADMIN — SODIUM CHLORIDE, POTASSIUM CHLORIDE, SODIUM LACTATE AND CALCIUM CHLORIDE: 600; 310; 30; 20 INJECTION, SOLUTION INTRAVENOUS at 12:31

## 2020-01-23 RX ADMIN — FENTANYL CITRATE 25 MCG: 50 INJECTION INTRAMUSCULAR; INTRAVENOUS at 15:58

## 2020-01-23 RX ADMIN — PROPOFOL 200 MG: 10 INJECTION, EMULSION INTRAVENOUS at 15:23

## 2020-01-23 RX ADMIN — ONDANSETRON 4 MG: 2 INJECTION INTRAMUSCULAR; INTRAVENOUS at 16:59

## 2020-01-23 RX ADMIN — Medication 5 MG: at 17:24

## 2020-01-23 RX ADMIN — FENTANYL CITRATE 25 MCG: 50 INJECTION INTRAMUSCULAR; INTRAVENOUS at 15:54

## 2020-01-23 RX ADMIN — ONDANSETRON 4 MG: 2 INJECTION, SOLUTION INTRAMUSCULAR; INTRAVENOUS at 15:54

## 2020-01-23 RX ADMIN — PROPOFOL 200 MG: 10 INJECTION, EMULSION INTRAVENOUS at 15:21

## 2020-01-23 RX ADMIN — HYDRALAZINE HYDROCHLORIDE 5 MG: 20 INJECTION INTRAMUSCULAR; INTRAVENOUS at 17:50

## 2020-01-23 RX ADMIN — CEFTRIAXONE SODIUM 1 G: 1 INJECTION, POWDER, FOR SOLUTION INTRAMUSCULAR; INTRAVENOUS at 15:28

## 2020-01-23 RX ADMIN — LIDOCAINE HYDROCHLORIDE 50 MG: 10 INJECTION, SOLUTION EPIDURAL; INFILTRATION; INTRACAUDAL; PERINEURAL at 15:21

## 2020-01-23 RX ADMIN — FENTANYL CITRATE 50 MCG: 50 INJECTION, SOLUTION INTRAMUSCULAR; INTRAVENOUS at 16:40

## 2020-01-23 ASSESSMENT — PAIN SCALES - WONG BAKER
WONGBAKER_NUMERICALRESPONSE: 0

## 2020-01-23 ASSESSMENT — PAIN DESCRIPTION - ORIENTATION
ORIENTATION: LEFT

## 2020-01-23 ASSESSMENT — PAIN DESCRIPTION - LOCATION
LOCATION: ABDOMEN

## 2020-01-23 ASSESSMENT — PULMONARY FUNCTION TESTS
PIF_VALUE: 33
PIF_VALUE: 1
PIF_VALUE: 17
PIF_VALUE: 1
PIF_VALUE: 2
PIF_VALUE: 1
PIF_VALUE: 17
PIF_VALUE: 12
PIF_VALUE: 12
PIF_VALUE: 17
PIF_VALUE: 17
PIF_VALUE: 13
PIF_VALUE: 12
PIF_VALUE: 17
PIF_VALUE: 12
PIF_VALUE: 17
PIF_VALUE: 12
PIF_VALUE: 0
PIF_VALUE: 17
PIF_VALUE: 1
PIF_VALUE: 12
PIF_VALUE: 2
PIF_VALUE: 17
PIF_VALUE: 12
PIF_VALUE: 17
PIF_VALUE: 1
PIF_VALUE: 17
PIF_VALUE: 17
PIF_VALUE: 0
PIF_VALUE: 17
PIF_VALUE: 2
PIF_VALUE: 17
PIF_VALUE: 12
PIF_VALUE: 17
PIF_VALUE: 17
PIF_VALUE: 1
PIF_VALUE: 2
PIF_VALUE: 17
PIF_VALUE: 12
PIF_VALUE: 17
PIF_VALUE: 12
PIF_VALUE: 12
PIF_VALUE: 17
PIF_VALUE: 1

## 2020-01-23 ASSESSMENT — PAIN DESCRIPTION - PAIN TYPE
TYPE: SURGICAL PAIN

## 2020-01-23 ASSESSMENT — ENCOUNTER SYMPTOMS: SHORTNESS OF BREATH: 0

## 2020-01-23 ASSESSMENT — PAIN SCALES - GENERAL
PAINLEVEL_OUTOF10: 5
PAINLEVEL_OUTOF10: 5
PAINLEVEL_OUTOF10: 8
PAINLEVEL_OUTOF10: 5
PAINLEVEL_OUTOF10: 9
PAINLEVEL_OUTOF10: 5
PAINLEVEL_OUTOF10: 9

## 2020-01-23 ASSESSMENT — PAIN - FUNCTIONAL ASSESSMENT: PAIN_FUNCTIONAL_ASSESSMENT: 0-10

## 2020-01-23 ASSESSMENT — PAIN DESCRIPTION - FREQUENCY
FREQUENCY: CONTINUOUS

## 2020-01-23 NOTE — OP NOTE
Boris Huddleston MD [address] [facility]. [place]    DATE:  1/23/2020    SURGEON:   Natasha Parson MD    ASSISTANT:  MD Natasha Ward MD    PREOPERATIVE DIAGNOSIS:  LEFT RENAL CALCULUS    POSTOPERATIVE DIAGNOSIS:  same    PROCEDURE PERFORMED:   CYSTOSCOPY, LEFT URETEROSCOPY, LASER LITHOTRIPSY, LEFT URETERAL STENT PLACEMENT       ANESTHESIA:  General    COMPLICATIONS:  None. ESTIMATED BLOOD LOSS:  <5 cc    SPECIMENS:  none    DRAINS:  left 6 Fr x 24 cm stent      INDICATIONS FOR PROCEDURE:  The patient is a 29 y.o. female with a history of kidney stones who is here today definitive treatment of stone burden. The risks and benefits of the procedure as well as possible alternatives and complications were discussed and she consented    DETAILS OF THE PROCEDURE:  The patient was correctly identified in the preoperative holding area. she was brought back to the operating room andGeneral was administered. she was prepped and draped in the usual sterile fashion in the dorsal lithotomy position. EPC cuffs were on, in place, and fully functional. she was given 1 g Rocephin  for antibiotic prophylaxis. After appropriate time-out was performed with all parties agreeing, a 22Fr cystoscope with a 30 degree lens was inserted into the bladder. The bladder was inspected and found to be free of stones or foreign bodies or tumors. The ureteral orifices were in the orthotopic location. Attention was turned to the left UO. A 0.035 glidewire was inserted through the scope and visualized in the left renal pelvis under fluoroscopy. a gibson catheter was inserted over the wire. A second glidewire was inserted under fluoroscopic guidance and the catheter was removed. A flexible ureteroscope was inserted over the wire and advanced into the pelvis.    There was a large stone located in the mid pole  A 200micron holmium laser fiber was inserted and used to fragment the stone into submillimeter sized pieces. The scope was advanced to the pelvis and a complete pyeloscopy was performed with no additional stones visualized. The scope was withdrawn and the ureter was clear of stones. The cystoscope was then backloaded over the wire and a 6x24 stent was advanced with good curl visualized in the renal pelvis under fluoroscopy and in the bladder under direct visualization. The bladder was drained and the scope was removed. The string was attached to per mons pubis with benzoin and steri strips. The patient tolerated the procedure well and was sent to PACU for postoperative monitoring. DISPOSITION:  The patient was discharged home in stable condition with instructions to follow up in 6 months with a KUB. She was instructed to remove the stent via the externalized string in 5 days.

## 2020-01-23 NOTE — ANESTHESIA PRE PROCEDURE
obstetric history(V23.49) O09.299    Placenta previa without hemorrhage, antepartum O44.00    Low lying placenta without hemorrhage, antepartum O44.40    Coagulation defect affecting pregnancy, antepartum ([facility] O99.119, D68.9    Other problem associated with amniotic cavity and membranes, antepartum O41.8X90    Mixed incontinence N39.46    Bilateral kidney stones N20.0    Frequency of micturition R35.0       Past Medical History:        Diagnosis Date    Abnormal Pap smear of cervix 2003    at 25years old    Blood in urine 2016    WITH BLOOD CLOTS    Difficult intravenous access     STATES VEINS ARE SMALL AND HARD TO SEE REQUEST IV BE PUT IN HAND    Fall 2016    TRIPPED OVER H2O HOSE IN BASEMENT FRACTURED RIGHT ANKLE    Flank pain     Gall stones 2018    GERD (gastroesophageal reflux disease)     Heart palpitations     Hematuria     Hypertension 2014    ON RX    Kidney stones 2004, 2007 & 2016 2018    PONV (postoperative nausea and vomiting)     request anti emetic. 01/22/2020 PATIENT STATES THAT SHE HAD A SCOPALAMINE PATCH AFTER HER LAST SURGERY, IT WORKED VERY WELL.     Sensation of pressure in bladder area     Ureteral stent retained 2010    tami    Urinary incontinence     Urinary leakage     Urinary urgency     Vitamin D deficiency     Wears glasses        Past Surgical History:        Procedure Laterality Date    ABDOMEN SURGERY  2014    LAPAROSCOPY    ANKLE FRACTURE SURGERY Right 2016    WITH HARDWARE    CHOLECYSTECTOMY  2018    COLPOSCOPY  2003    at age 25 years s/p abnormal pap smear    CYSTOSCOPY  4/20/16    video urodynamics/cystoscopy under local anesthesia    CYSTOSCOPY  10/08/2016    WITH BILAT STENTS [facility] KIDNEY STONES AT [facility]    CYSTOSCOPY  2004    WITH STONE BASKETING    CYSTOSCOPY Bilateral 10/13/2016    bilat retrogrades, bilat stent change, left stone basketing, right holmium laser    CYSTOSCOPY  09/07/2018    occ. balloon    DILATION AND CURETTAGE OF UTERUS 2014    ENDOMETRIAL ABLATION  2018    FRACTURE SURGERY Right 2016    ANKLE, HARDWARE REMAINS IN PLACE    HERNIA REPAIR  8705    UMBILICAL    HYSTEROSCOPY  2014    LITHOTRIPSY  2012    NEPHROSTOMY Left 2018    HOLMIUM - NEPHROLITHOTOMY PERCUTANEOUS--lithoclast    OTHER SURGICAL HISTORY Bilateral 2010    sent removal; cysto    PELVIC LAPAROSCOPY  2014    with d and c, hysteroscopy    [place] CYSTOURETHROSCOPY Left 2018    CYSTOSCOPY, INSERTION OCCLUSIVE BALLOON - PT TO INTERV RADIOLOGY POST OP performed by Sita Santos MD at [address] [facility] TO 2 CM Left 2018    HOLMIUM - NEPHROLITHOTOMY PERCUTANEOUS, C-ARM, LITHOCLAST, PT COMING FROM INTERV RADIOLOGY performed by Sita Santos MD at [address] [facility]  2018   [address]  2007       Social History:    Social History     Tobacco Use    Smoking status: Former Smoker     Years: 3.00     Types: Cigarettes     Last attempt to quit: 2005     Years since quittin.4    Smokeless tobacco: Never Used    Tobacco comment: 2 CIGARETTES PER DAY   Substance Use Topics    Alcohol use: Yes     Alcohol/week: 1.0 standard drinks     Types: 1 Glasses of wine per week     Comment: A FEW TIMES PER YEAR. Counseling given: Not Answered  Comment: 2 CIGARETTES PER DAY      Vital Signs (Current): There were no vitals filed for this visit.                                            BP Readings from Last 3 Encounters:   20 126/87   19 (!) 148/96   19 (!) 169/99       NPO Status:                                                                                 BMI:   Wt Readings from Last 3 Encounters:   20 155 lb (70.3 kg)   20 152 lb (68.9 kg)   19 180 lb (81.6 kg)     There is no height or weight on file to calculate BMI.    CBC:   Lab Results   Component Value Date    WBC 15.7 2018    RBC 3.42 2018    HGB 9.4 2018    HCT 28.5 09/08/2018    MCV 83.3 09/08/2018    RDW 16.2 09/08/2018     09/08/2018       CMP:   Lab Results   Component Value Date     09/08/2018    K 3.9 09/08/2018     09/08/2018    CO2 24 09/08/2018    BUN 5 09/08/2018    CREATININE 0.62 09/08/2018    GFRAA >60 09/08/2018    LABGLOM >60 09/08/2018    GLUCOSE 92 09/08/2018    PROT 8.7 08/23/2018    CALCIUM 8.2 09/08/2018    BILITOT 0.37 08/23/2018    ALKPHOS 86 08/23/2018    AST 23 08/23/2018    ALT 27 08/23/2018       POC Tests:   No results for input(s): POCGLU, POCNA, POCK, POCCL, POCBUN, POCHEMO, POCHCT in the last 72 hours. Coags:   Lab Results   Component Value Date    PROTIME 10.4 09/07/2018    INR 1.0 09/07/2018    APTT 21.5 09/07/2018       HCG (If Applicable):   Lab Results   Component Value Date    HCG NEGATIVE 09/07/2018        ABGs: No results found for: PHART, PO2ART, IAE6XOF, FQZ7HEZ, BEART, R9DXHXQR     Type & Screen (If Applicable):  No results found for: LABABO, [address]    Anesthesia Evaluation  Patient summary reviewed and Nursing notes reviewed   history of anesthetic complications: PONV. Airway: Mallampati: I  TM distance: >3 FB   Neck ROM: full  Mouth opening: > = 3 FB Dental:          Pulmonary: breath sounds clear to auscultation      (-) COPD, asthma, shortness of breath, recent URI and sleep apnea                           Cardiovascular:  Exercise tolerance: good (>4 METS),   (+) hypertension:,     (-) past MI, CAD, CABG/stent,  angina,  CHF, orthopnea and  BROCK      Rhythm: regular  Rate: normal           Beta Blocker:  Not on Beta Blocker         Neuro/Psych:   Negative Neuro/Psych ROS     (-) seizures, TIA and CVA           GI/Hepatic/Renal:   (+) GERD:, renal disease: kidney stones,           Endo/Other: Negative Endo/Other ROS       (-) diabetes mellitus               Abdominal:           Vascular: negative vascular ROS.                                        Anesthesia Plan      general     ASA 2     (GA LMA)  Induction: intravenous. MIPS: Postoperative opioids intended and Prophylactic antiemetics administered. Anesthetic plan and risks discussed with patient.       Plan discussed with CRNA and surgical team.

## 2020-01-23 NOTE — LETTER
Francisca Huddleston MD [address] [facility], [address] [place] [address],[address]  [place], [address] [facility]  P: [phone] / F: [phone]    Brief Postoperative Note  Surgical Facility: [facility]   1/23/20    Mima Ruffin  [id number]  1985    Dear No primary care provider on file.,    I've enclosed a Brief Op note on a procedure performed on your patient, Mima Ruffin today. Pre-operative Diagnosis: Left renal calculus  Post-operative Diagnosis: Same    Procedure: Cystoscopy, Left ureteroscopy and Holmium laser lithotripsy with ureteral stent insertion ([id number]) with string     Anesthesia: General    Surgeon: Tiffany Caldwell; Resident: Debo Alvarado    Findings: Large mid pole kidney stone broken up nicely with Holmium laser lithotripsy     Plan: Remove stent with string in 5 days. Follow up in 6 months with KUB prior. Thank you for allowing me to participate in the care of this patient. I will keep you updated on this patient's follow up and I look forward to serving you and your patients again in the future.         Electronically signed by Manju Kahn MD, FACS

## 2020-01-23 NOTE — H&P
WITH STONE BASKETING    CYSTOSCOPY Bilateral 10/13/2016    bilat retrogrades, bilat stent change, left stone basketing, right holmium laser    CYSTOSCOPY  09/07/2018    occ. balloon    DILATION AND CURETTAGE OF UTERUS  2014    ENDOMETRIAL ABLATION  2018    FRACTURE SURGERY Right 2016    ANKLE, HARDWARE REMAINS IN PLACE    HERNIA REPAIR  4850    UMBILICAL    HYSTEROSCOPY  2014    LITHOTRIPSY  2012    NEPHROSTOMY Left 09/07/2018    HOLMIUM - NEPHROLITHOTOMY PERCUTANEOUS--lithoclast    OTHER SURGICAL HISTORY Bilateral 2010    sent removal; cysto    PELVIC LAPAROSCOPY  2014    with d and c, hysteroscopy    [place] CYSTOURETHROSCOPY Left 9/7/2018    CYSTOSCOPY, INSERTION OCCLUSIVE BALLOON - PT TO INTERV RADIOLOGY POST OP performed by Jasmeet Maria MD at [address] [place] [address] TO 2 CM Left 9/7/2018    HOLMIUM - NEPHROLITHOTOMY PERCUTANEOUS, C-ARM, LITHOCLAST, PT COMING FROM INTERV RADIOLOGY performed by Jasmeet Maria MD at [address] [place] [address]  2018   [address]  [address]       Medications Prior to Admission:    Prior to Admission medications    Medication Sig Start Date End Date Taking? Authorizing Provider   losartan (COZAAR) 50 MG tablet Take 50 mg by mouth daily  12/6/19  Yes Historical Provider, MD   NIFEdipine (PROCARDIA XL) 30 MG extended release tablet Take 30 mg by mouth daily 7/16/18  Yes Historical Provider, MD   oxybutynin (DITROPAN-XL) 10 MG extended release tablet Take 1 tablet by mouth 2 times daily  Patient taking differently: Take 10 mg by mouth 2 times daily 01/22/2020 Has not started this medication yet.  1/16/20   Jasmeet Maria MD   acetaminophen (TYLENOL) 325 MG tablet Take 650 mg by mouth every 6 hours as needed for Pain    Historical Provider, MD       Allergies:  Levaquin [levofloxacin in d5w]    Social History:    Social History     Socioeconomic History    Marital status:      Spouse name: Not on file    Number of children: Not on file    Years of education: Not on file    Highest education level: Not on file   Occupational History    Not on file   Social Needs    Financial resource strain: Not on file    Food insecurity:     Worry: Not on file     Inability: Not on file    Transportation needs:     Medical: Not on file     Non-medical: Not on file   Tobacco Use    Smoking status: Former Smoker     Years: 3.00     Types: Cigarettes     Last attempt to quit: 2005     Years since quittin.4    Smokeless tobacco: Never Used    Tobacco comment: 2 CIGARETTES PER DAY   Substance and Sexual Activity    Alcohol use: Yes     Alcohol/week: 1.0 standard drinks     Types: 1 Glasses of wine per week     Comment: A FEW TIMES PER YEAR.     Drug use: No    Sexual activity: Not Currently   Lifestyle    Physical activity:     Days per week: Not on file     Minutes per session: Not on file    Stress: Not on file   Relationships    Social connections:     Talks on phone: Not on file     Gets together: Not on file     Attends Christian service: Not on file     Active member of club or organization: Not on file     Attends meetings of clubs or organizations: Not on file     Relationship status: Not on file    Intimate partner violence:     Fear of current or ex partner: Not on file     Emotionally abused: Not on file     Physically abused: Not on file     Forced sexual activity: Not on file   Other Topics Concern    Not on file   Social History Narrative    Not on file       Family History:    Family History   Problem Relation Age of Onset    Heart Attack Paternal Aunt     Heart Attack Paternal Grandmother     High Blood Pressure Paternal Grandmother     Diabetes Mother     High Blood Pressure Mother     Heart Attack Mother     Heart Disease Mother     Heart Disease Father         CAD CARDIAC STENTS    High Blood Pressure Father     Asthma Maternal Grandmother     Diabetes Maternal Grandmother     Heart Disease Maternal Grandmother         MILD MI    High Blood Pressure Maternal Grandmother     Cancer Maternal Grandfather         LEUKEMIA    High Blood Pressure Maternal Grandfather     High Blood Pressure Paternal Grandfather        REVIEW OF SYSTEMS:  Constitutional: negative  Eyes: negative  Respiratory: negative  Cardiovascular: negative  Gastrointestinal: negative  Genitourinary: no acute issues  Musculoskeletal: negative  Skin: negative   Neurological: negative  Hematological/Lymphatic: negative  Psychological: negative    PHYSICAL EXAM:    No data found. Constitutional: Patient in NAD  Neuro: Alert and oriented to person, place, and time. Psych: Mood and affect normal.  Skin: Clean, dry, intact   Lungs: Respiratory effort normal, CTA  Cardiovascular:  Normal peripheral pulses; no murmur. Normal rhythm  Abdomen: Soft, non-tender, non-distended with no CVA, flank pain, hepatosplenomegaly or hernia. Mild left CVA tenderness  Bladder: non-tender and not distended. LABS:   No results for input(s): WBC, HGB, HCT, MCV, PLT in the last 72 hours. No results for input(s): NA, K, CL, CO2, PHOS, BUN, CREATININE in the last 72 hours. Invalid input(s): CA  No results found for: PSA      Urinalysis: No results for input(s): COLORU, PHUR, LABCAST, WBCUA, RBCUA, MUCUS, TRICHOMONAS, YEAST, BACTERIA, CLARITYU, SPECGRAV, LEUKOCYTESUR, UROBILINOGEN, [place] in the last 72 hours.     Invalid input(s): NITRATE, GLUCOSEUKETONESUAMORPHOUS     -----------------------------------------------------------------      ASSESSMENT AND PLAN:    Impression:    Left nephrolithiasis    Patient Active Problem List   Diagnosis    Benign essential hypertension, antepartum    History of  labor, current pregnancy    Pregnancy with other poor obstetric history(V23.49)    Placenta previa without hemorrhage, antepartum    Low lying placenta without hemorrhage, antepartum    [facility]  Other problem associated with amniotic cavity and membranes, antepartum    Mixed incontinence    Bilateral kidney stones    Frequency of micturition       Plan: Cystoscopy, Left ureteroscopy and Holmium laser lithotripsy with ureteral stent insertion under GA in OR today.     Consent obtained    John Alarcon PA-C  1:18 PM 1/23/2020

## 2020-02-17 RX ORDER — TAMSULOSIN HYDROCHLORIDE 0.4 MG/1
0.4 CAPSULE ORAL DAILY
Qty: 30 CAPSULE | Refills: 0 | Status: SHIPPED | OUTPATIENT
Start: 2020-02-17

## 2020-05-04 ENCOUNTER — TELEPHONE (OUTPATIENT)
Dept: UROLOGY | Age: 35
End: 2020-05-04

## 2020-05-08 ENCOUNTER — TELEPHONE (OUTPATIENT)
Dept: UROLOGY | Age: 35
End: 2020-05-08

## 2020-05-08 NOTE — TELEPHONE ENCOUNTER
Spoke to patient regarding KUB, patient stated she would get x ray done prior to office visit  5-14-20

## 2020-05-12 ENCOUNTER — HOSPITAL ENCOUNTER (OUTPATIENT)
Age: 35
Discharge: HOME | End: 2020-05-14
Payer: MEDICARE

## 2020-05-12 ENCOUNTER — HOSPITAL ENCOUNTER (OUTPATIENT)
Dept: GENERAL RADIOLOGY | Age: 35
Discharge: HOME | End: 2020-05-14
Payer: MEDICARE

## 2020-05-12 PROCEDURE — 74018 RADEX ABDOMEN 1 VIEW: CPT

## 2020-05-19 ENCOUNTER — TELEPHONE (OUTPATIENT)
Dept: UROLOGY | Age: 35
End: 2020-05-19

## 2020-05-19 NOTE — TELEPHONE ENCOUNTER
Patient called preservice to schedule an appt with Dr Kaitlyn Morgan. Unable to schedule because patient was dismissed. Patient wants results of her testing she recently had completed. Please advise and call patient.

## 2024-01-04 ENCOUNTER — HOSPITAL ENCOUNTER (EMERGENCY)
Age: 39
Discharge: HOME | End: 2024-01-04
Attending: EMERGENCY MEDICINE
Payer: MEDICAID

## 2024-01-04 ENCOUNTER — APPOINTMENT (OUTPATIENT)
Dept: CT IMAGING | Age: 39
End: 2024-01-04
Payer: MEDICAID

## 2024-01-04 VITALS
OXYGEN SATURATION: 100 % | SYSTOLIC BLOOD PRESSURE: 156 MMHG | TEMPERATURE: 98.2 F | HEART RATE: 89 BPM | DIASTOLIC BLOOD PRESSURE: 93 MMHG | WEIGHT: 155 LBS | RESPIRATION RATE: 18 BRPM | HEIGHT: 60 IN | BODY MASS INDEX: 30.43 KG/M2

## 2024-01-04 DIAGNOSIS — R10.9 ABDOMINAL PAIN, UNSPECIFIED ABDOMINAL LOCATION: Primary | ICD-10-CM

## 2024-01-04 LAB
ALBUMIN SERPL-MCNC: 4.4 G/DL (ref 3.5–5.2)
ALP SERPL-CCNC: 105 U/L (ref 35–104)
ALT SERPL-CCNC: 12 U/L (ref 5–33)
ANION GAP SERPL CALCULATED.3IONS-SCNC: 11 MMOL/L (ref 9–17)
AST SERPL-CCNC: 15 U/L
BASOPHILS # BLD: 0.03 K/UL (ref 0–0.2)
BASOPHILS NFR BLD: 0 % (ref 0–2)
BILIRUB SERPL-MCNC: 0.2 MG/DL (ref 0.3–1.2)
BUN SERPL-MCNC: 14 MG/DL (ref 6–20)
BUN/CREAT SERPL: 13 (ref 9–20)
CALCIUM SERPL-MCNC: 9.4 MG/DL (ref 8.6–10.4)
CHLORIDE SERPL-SCNC: 104 MMOL/L (ref 98–107)
CO2 SERPL-SCNC: 24 MMOL/L (ref 20–31)
CREAT SERPL-MCNC: 1.1 MG/DL (ref 0.5–0.9)
EOSINOPHIL # BLD: 0.12 K/UL (ref 0–0.44)
EOSINOPHILS RELATIVE PERCENT: 2 % (ref 1–4)
ERYTHROCYTE [DISTWIDTH] IN BLOOD BY AUTOMATED COUNT: 14.4 % (ref 11.8–14.4)
GFR SERPL CREATININE-BSD FRML MDRD: >60 ML/MIN/1.73M2
GLUCOSE SERPL-MCNC: 88 MG/DL (ref 70–99)
HCT VFR BLD AUTO: 40 % (ref 36.3–47.1)
HGB BLD-MCNC: 12.6 G/DL (ref 11.9–15.1)
IMM GRANULOCYTES # BLD AUTO: 0.02 K/UL (ref 0–0.3)
IMM GRANULOCYTES NFR BLD: 0 %
LIPASE SERPL-CCNC: 42 U/L (ref 13–60)
LYMPHOCYTES NFR BLD: 3.64 K/UL (ref 1.1–3.7)
LYMPHOCYTES RELATIVE PERCENT: 44 % (ref 24–43)
MCH RBC QN AUTO: 29.2 PG (ref 25.2–33.5)
MCHC RBC AUTO-ENTMCNC: 31.5 G/DL (ref 28.4–34.8)
MCV RBC AUTO: 92.8 FL (ref 82.6–102.9)
MONOCYTES NFR BLD: 0.65 K/UL (ref 0.1–1.2)
MONOCYTES NFR BLD: 8 % (ref 3–12)
NEUTROPHILS NFR BLD: 46 % (ref 36–65)
NEUTS SEG NFR BLD: 3.73 K/UL (ref 1.5–8.1)
NRBC BLD-RTO: 0 PER 100 WBC
PLATELET # BLD AUTO: 264 K/UL (ref 138–453)
PMV BLD AUTO: 8.7 FL (ref 8.1–13.5)
POTASSIUM SERPL-SCNC: 4.2 MMOL/L (ref 3.7–5.3)
PROT SERPL-MCNC: 8.4 G/DL (ref 6.4–8.3)
RBC # BLD AUTO: 4.31 M/UL (ref 3.95–5.11)
SODIUM SERPL-SCNC: 139 MMOL/L (ref 135–144)
WBC OTHER # BLD: 8.2 K/UL (ref 3.5–11.3)

## 2024-01-04 PROCEDURE — 99284 EMERGENCY DEPT VISIT MOD MDM: CPT

## 2024-01-04 PROCEDURE — 80053 COMPREHEN METABOLIC PANEL: CPT

## 2024-01-04 PROCEDURE — 85025 COMPLETE CBC W/AUTO DIFF WBC: CPT

## 2024-01-04 PROCEDURE — 74176 CT ABD & PELVIS W/O CONTRAST: CPT

## 2024-01-04 PROCEDURE — 83690 ASSAY OF LIPASE: CPT

## 2024-01-04 RX ORDER — 0.9 % SODIUM CHLORIDE 0.9 %
1000 INTRAVENOUS SOLUTION INTRAVENOUS ONCE
Status: DISCONTINUED | OUTPATIENT
Start: 2024-01-04 | End: 2024-01-04

## 2024-01-04 RX ORDER — OMEPRAZOLE 20 MG/1
20 CAPSULE, DELAYED RELEASE ORAL
Qty: 30 CAPSULE | Refills: 0 | Status: SHIPPED | OUTPATIENT
Start: 2024-01-04

## 2024-01-04 ASSESSMENT — PAIN DESCRIPTION - LOCATION: LOCATION: ARM

## 2024-01-04 ASSESSMENT — PAIN - FUNCTIONAL ASSESSMENT: PAIN_FUNCTIONAL_ASSESSMENT: 0-10

## 2024-01-04 ASSESSMENT — PAIN DESCRIPTION - ORIENTATION: ORIENTATION: RIGHT

## 2024-01-04 ASSESSMENT — PAIN SCALES - GENERAL: PAINLEVEL_OUTOF10: 6

## 2024-01-05 NOTE — DISCHARGE INSTRUCTIONS
Take meds as prescribed.  Follow outpatient tomorrow with doctor or by calling [phone] or [phone].   Return to ER immediately if symptoms worsen or persist.

## 2024-01-05 NOTE — ED PROVIDER NOTES
eMERGENCY dEPARTMENT eNCOUnter   Independent Attestation     Pt Name: Aliza Holman  MRN: [id number]  Birthdate 1985  Date of evaluation: 1/4/24     Aliza Holman is a 38 y.o. female with CC: Hypertension and Abdominal Pain      This visit was performed by both a physician and an APC. I performed all aspects of the MDM as documented. Based on the medical record the care appears appropriate.  I was personally available for consultation in the Emergency Department.    The care is provided during an unprecedented national emergency due to the novel coronavirus, COVID 19.    Gordon Chan MD  Attending Emergency Physician                  Gordon Chan MD  01/04/24 8008

## 2024-01-08 NOTE — ED PROVIDER NOTES
[facility] ED  eMERGENCY dEPARTMENTeNCOUnter      Pt Name: Aliza Holman  MRN: [id number]  Birthdate 1985  Date ofevaluation: 1/4/2024  Provider: Tony Ocasio PA-C    CHIEF COMPLAINT       Chief Complaint   Patient presents with    Hypertension    Abdominal Pain         HISTORY OF PRESENT ILLNESS  (Location/Symptom, Timing/Onset, Context/Setting, Quality, Duration, Modifying Factors, Severity.)   Aliza Holman is a 38 y.o. female who presents to the emergency department with abdominal pain last couple days.  Patient also notes high blood pressure.  No fevers or chills.  Some nausea.  No active vomiting.      Nursing Notes were reviewed.    ALLERGIES     Levaquin [levofloxacin in d5w]    CURRENT MEDICATIONS       Discharge Medication List as of 1/4/2024  9:54 PM        CONTINUE these medications which have NOT CHANGED    Details   tamsulosin (FLOMAX) 0.4 MG capsule Take 1 capsule by mouth daily, Disp-30 capsule, R-0Normal      oxybutynin (DITROPAN-XL) 10 MG extended release tablet Take 2 tablets by mouth 2 times daily, Disp-10 tablet, R-0Print      losartan (COZAAR) 50 MG tablet Take 1 tablet by mouth dailyHistorical Med      acetaminophen (TYLENOL) 325 MG tablet Take 2 tablets by mouth every 6 hours as needed for PainHistorical Med      NIFEdipine (PROCARDIA XL) 30 MG extended release tablet Take 30 mg by mouth dailyHistorical Med             PAST MEDICAL HISTORY         Diagnosis Date    Abnormal Pap smear of cervix 2003    at 18 years old    Blood in urine 2016    WITH BLOOD CLOTS    Difficult intravenous access     STATES VEINS ARE SMALL AND HARD TO SEE REQUEST IV BE PUT IN HAND    Fall 2016    TRIPPED OVER H2O HOSE IN BASEMENT FRACTURED RIGHT ANKLE    Flank pain     Gall stones 2018    GERD (gastroesophageal reflux disease)     Heart palpitations     Hematuria     Hypertension 2014    ON RX    Kidney stones 2004, 2007 & 2016 2018    PONV (postoperative nausea and vomiting)

## 2024-02-05 ENCOUNTER — APPOINTMENT (OUTPATIENT)
Dept: GENERAL RADIOLOGY | Age: 39
End: 2024-02-05
Payer: MEDICAID

## 2024-02-05 ENCOUNTER — HOSPITAL ENCOUNTER (EMERGENCY)
Age: 39
Discharge: HOME | End: 2024-02-05
Attending: EMERGENCY MEDICINE
Payer: MEDICAID

## 2024-02-05 VITALS
OXYGEN SATURATION: 100 % | RESPIRATION RATE: 16 BRPM | WEIGHT: 172 LBS | SYSTOLIC BLOOD PRESSURE: 144 MMHG | HEART RATE: 98 BPM | TEMPERATURE: 98.1 F | BODY MASS INDEX: 33.77 KG/M2 | HEIGHT: 60 IN | DIASTOLIC BLOOD PRESSURE: 94 MMHG

## 2024-02-05 DIAGNOSIS — Z03.821 SUSPECTED INGESTED FOREIGN BODY NOT FOUND AFTER OBSERVATION: Primary | ICD-10-CM

## 2024-02-05 LAB
FERRITIN SERPL-MCNC: 295 NG/ML (ref 13–150)
HCG UR QL: ABNORMAL

## 2024-02-05 PROCEDURE — 83540 ASSAY OF IRON: CPT

## 2024-02-05 PROCEDURE — 82728 ASSAY OF FERRITIN: CPT

## 2024-02-05 PROCEDURE — 71045 X-RAY EXAM CHEST 1 VIEW: CPT

## 2024-02-05 PROCEDURE — 83550 IRON BINDING TEST: CPT

## 2024-02-05 PROCEDURE — 83735 ASSAY OF MAGNESIUM: CPT

## 2024-02-05 PROCEDURE — 99284 EMERGENCY DEPT VISIT MOD MDM: CPT

## 2024-02-05 PROCEDURE — 81025 URINE PREGNANCY TEST: CPT

## 2024-02-05 PROCEDURE — 80048 BASIC METABOLIC PNL TOTAL CA: CPT

## 2024-02-05 ASSESSMENT — PAIN - FUNCTIONAL ASSESSMENT: PAIN_FUNCTIONAL_ASSESSMENT: 0-10

## 2024-02-05 ASSESSMENT — PAIN SCALES - GENERAL: PAINLEVEL_OUTOF10: 7

## 2024-02-06 LAB
ANION GAP SERPL CALCULATED.3IONS-SCNC: 14 MMOL/L (ref 9–17)
BUN SERPL-MCNC: 22 MG/DL (ref 6–20)
BUN/CREAT SERPL: 28 (ref 9–20)
CALCIUM SERPL-MCNC: 10.1 MG/DL (ref 8.6–10.4)
CHLORIDE SERPL-SCNC: 100 MMOL/L (ref 98–107)
CO2 SERPL-SCNC: 23 MMOL/L (ref 20–31)
CREAT SERPL-MCNC: 0.8 MG/DL (ref 0.5–0.9)
GFR SERPL CREATININE-BSD FRML MDRD: >60 ML/MIN/1.73M2
GLUCOSE SERPL-MCNC: 102 MG/DL (ref 70–99)
IRON SATN MFR SERPL: 13 % (ref 20–55)
IRON SERPL-MCNC: 39 UG/DL (ref 37–145)
MAGNESIUM SERPL-MCNC: 1.9 MG/DL (ref 1.6–2.6)
POTASSIUM SERPL-SCNC: 4.1 MMOL/L (ref 3.7–5.3)
SODIUM SERPL-SCNC: 137 MMOL/L (ref 135–144)
TIBC SERPL-MCNC: 291 UG/DL (ref 250–450)
UNSATURATED IRON BINDING CAPACITY: 252 UG/DL (ref 112–347)

## 2024-02-06 NOTE — ED NOTES
Pt present to ED vi private auto with c/o abdominal pain, onset today. Pt states she may have swallowed a sewing needle but she is unsure. Pt afebrile, vital stable. Pt alert and oriented x4. Even, non-labored breathing.

## 2024-02-06 NOTE — DISCHARGE INSTRUCTIONS
No sewing needle was identified on your X-ray.    Follow-up in MyChart results of your pending lab work.    Return to this emergency room immediately if your symptoms persist, worsen or if new ones form.    Make sure you follow-up with your primary care doctor within the next 1-2 business days.

## 2024-02-06 NOTE — ED PROVIDER NOTES
EMERGENCY DEPARTMENT ENCOUNTER    Pt Name: Aliza Holman  MRN: [id number]  Birthdate 1985  Date of evaluation: 2/5/24  CHIEF COMPLAINT       Chief Complaint   Patient presents with    Abdominal Pain     Pt states her abdominal pain started 3xhours ago, pt thinks she may have swallowed a sewing needle, pt keeps needle in between lips. Pt denies nausea or vomiting.      HISTORY OF PRESENT ILLNESS   HPI   Patient is a 38-year-old female who presents to the ED concerned that she swallowed a sewing needle earlier today.  Patient was repairing clothing, holding a needle in between her lips, became distracted and lost track of the needle.  She cannot find the needle on her clothing, furniture or the floor.  The only other possibility that she thought of was that she swallowed the needle.  However, no throat pain, choking spell, sensation of swallowing foreign object.  Also would like her magnesium and iron levels checked as she take supplements and has not had her levels checked in some time.    REVIEW OF SYSTEMS     Review of Systems  All other systems reviewed and are negative.    PASTMEDICAL HISTORY     Past Medical History:   Diagnosis Date    Abnormal Pap smear of cervix 2003    at 18 years old    Blood in urine 2016    WITH BLOOD CLOTS    Difficult intravenous access     STATES VEINS ARE SMALL AND HARD TO SEE REQUEST IV BE PUT IN HAND    Fall 2016    TRIPPED OVER H2O HOSE IN BASEMENT FRACTURED RIGHT ANKLE    Flank pain     Gall stones 2018    GERD (gastroesophageal reflux disease)     Heart palpitations     Hematuria     Hypertension 2014    ON RX    Kidney stones 2004, 2007 & 2016 2018    PONV (postoperative nausea and vomiting)     request anti emetic. 01/22/2020 PATIENT STATES THAT SHE HAD A SCOPALAMINE PATCH AFTER HER LAST SURGERY, IT WORKED VERY WELL.    Sensation of pressure in bladder area     Ureteral stent retained 2010    tami    Urinary incontinence     Urinary leakage     Urinary urgency     Vitamin

## 2024-04-21 ENCOUNTER — APPOINTMENT (OUTPATIENT)
Dept: ULTRASOUND IMAGING | Age: 39
End: 2024-04-21
Payer: MEDICAID

## 2024-04-21 ENCOUNTER — HOSPITAL ENCOUNTER (EMERGENCY)
Age: 39
Discharge: HOME | End: 2024-04-21
Attending: EMERGENCY MEDICINE
Payer: MEDICAID

## 2024-04-21 VITALS
DIASTOLIC BLOOD PRESSURE: 83 MMHG | SYSTOLIC BLOOD PRESSURE: 128 MMHG | RESPIRATION RATE: 18 BRPM | HEART RATE: 71 BPM | BODY MASS INDEX: 35.05 KG/M2 | WEIGHT: 179.45 LBS | TEMPERATURE: 97.5 F | OXYGEN SATURATION: 98 %

## 2024-04-21 DIAGNOSIS — R10.11 RIGHT UPPER QUADRANT ABDOMINAL PAIN: Primary | ICD-10-CM

## 2024-04-21 LAB
ALBUMIN SERPL-MCNC: 4.5 G/DL (ref 3.5–5.2)
ALBUMIN/GLOB SERPL: 1 {RATIO} (ref 1–2.5)
ALP SERPL-CCNC: 73 U/L (ref 35–104)
ALT SERPL-CCNC: 7 U/L (ref 10–35)
ANION GAP SERPL CALCULATED.3IONS-SCNC: 13 MMOL/L (ref 9–16)
AST SERPL-CCNC: 22 U/L (ref 10–35)
BASOPHILS # BLD: 0.04 K/UL (ref 0–0.2)
BASOPHILS NFR BLD: 1 % (ref 0–2)
BILIRUB DIRECT SERPL-MCNC: <0.2 MG/DL (ref 0–0.3)
BILIRUB INDIRECT SERPL-MCNC: 0.2 MG/DL (ref 0–1)
BILIRUB SERPL-MCNC: 0.3 MG/DL (ref 0–1.2)
BILIRUB UR QL STRIP: NEGATIVE
BUN SERPL-MCNC: 16 MG/DL (ref 6–20)
CALCIUM SERPL-MCNC: 9.7 MG/DL (ref 8.6–10.4)
CHLORIDE SERPL-SCNC: 101 MMOL/L (ref 98–107)
CLARITY UR: CLEAR
CO2 SERPL-SCNC: 23 MMOL/L (ref 20–31)
COLOR UR: YELLOW
COMMENT: NORMAL
CREAT SERPL-MCNC: 1 MG/DL (ref 0.5–0.9)
EOSINOPHIL # BLD: 0.09 K/UL (ref 0–0.44)
EOSINOPHILS RELATIVE PERCENT: 2 % (ref 1–4)
ERYTHROCYTE [DISTWIDTH] IN BLOOD BY AUTOMATED COUNT: 13.4 % (ref 11.8–14.4)
GFR SERPL CREATININE-BSD FRML MDRD: 77 ML/MIN/1.73M2
GLOBULIN SER CALC-MCNC: 3.2 G/DL
GLUCOSE SERPL-MCNC: 84 MG/DL (ref 74–99)
GLUCOSE UR STRIP-MCNC: NEGATIVE MG/DL
HCG SERPL QL: NEGATIVE
HCT VFR BLD AUTO: 42.3 % (ref 36.3–47.1)
HGB BLD-MCNC: 13.8 G/DL (ref 11.9–15.1)
HGB UR QL STRIP.AUTO: NEGATIVE
IMM GRANULOCYTES # BLD AUTO: <0.03 K/UL (ref 0–0.3)
IMM GRANULOCYTES NFR BLD: 0 %
KETONES UR STRIP-MCNC: NEGATIVE MG/DL
LEUKOCYTE ESTERASE UR QL STRIP: NEGATIVE
LIPASE SERPL-CCNC: 29 U/L (ref 13–60)
LYMPHOCYTES NFR BLD: 2.9 K/UL (ref 1.1–3.7)
LYMPHOCYTES RELATIVE PERCENT: 50 % (ref 24–43)
MCH RBC QN AUTO: 29.4 PG (ref 25.2–33.5)
MCHC RBC AUTO-ENTMCNC: 32.6 G/DL (ref 28.4–34.8)
MCV RBC AUTO: 90.2 FL (ref 82.6–102.9)
MONOCYTES NFR BLD: 0.52 K/UL (ref 0.1–1.2)
MONOCYTES NFR BLD: 9 % (ref 3–12)
NEUTROPHILS NFR BLD: 38 % (ref 36–65)
NEUTS SEG NFR BLD: 2.2 K/UL (ref 1.5–8.1)
NITRITE UR QL STRIP: NEGATIVE
NRBC BLD-RTO: 0 PER 100 WBC
PH UR STRIP: 6.5 [PH] (ref 5–8)
PLATELET # BLD AUTO: 283 K/UL (ref 138–453)
PMV BLD AUTO: 8.5 FL (ref 8.1–13.5)
POTASSIUM SERPL-SCNC: 3.8 MMOL/L (ref 3.7–5.3)
PROT SERPL-MCNC: 7.7 G/DL (ref 6.6–8.7)
PROT UR STRIP-MCNC: NEGATIVE MG/DL
RBC # BLD AUTO: 4.69 M/UL (ref 3.95–5.11)
SODIUM SERPL-SCNC: 137 MMOL/L (ref 136–145)
SP GR UR STRIP: 1 (ref 1–1.03)
UROBILINOGEN UR STRIP-ACNC: NORMAL EU/DL (ref 0–1)
WBC OTHER # BLD: 5.8 K/UL (ref 3.5–11.3)

## 2024-04-21 PROCEDURE — 80076 HEPATIC FUNCTION PANEL: CPT

## 2024-04-21 PROCEDURE — 99284 EMERGENCY DEPT VISIT MOD MDM: CPT

## 2024-04-21 PROCEDURE — 96361 HYDRATE IV INFUSION ADD-ON: CPT

## 2024-04-21 PROCEDURE — 96374 THER/PROPH/DIAG INJ IV PUSH: CPT

## 2024-04-21 PROCEDURE — 83690 ASSAY OF LIPASE: CPT

## 2024-04-21 PROCEDURE — 6360000002 HC RX W HCPCS: Performed by: STUDENT IN AN ORGANIZED HEALTH CARE EDUCATION/TRAINING PROGRAM

## 2024-04-21 PROCEDURE — 96375 TX/PRO/DX INJ NEW DRUG ADDON: CPT

## 2024-04-21 PROCEDURE — 2580000003 HC RX 258: Performed by: STUDENT IN AN ORGANIZED HEALTH CARE EDUCATION/TRAINING PROGRAM

## 2024-04-21 PROCEDURE — 80048 BASIC METABOLIC PNL TOTAL CA: CPT

## 2024-04-21 PROCEDURE — 6370000000 HC RX 637 (ALT 250 FOR IP): Performed by: STUDENT IN AN ORGANIZED HEALTH CARE EDUCATION/TRAINING PROGRAM

## 2024-04-21 PROCEDURE — 81003 URINALYSIS AUTO W/O SCOPE: CPT

## 2024-04-21 PROCEDURE — 84703 CHORIONIC GONADOTROPIN ASSAY: CPT

## 2024-04-21 PROCEDURE — 85027 COMPLETE CBC AUTOMATED: CPT

## 2024-04-21 PROCEDURE — 76705 ECHO EXAM OF ABDOMEN: CPT

## 2024-04-21 RX ORDER — ONDANSETRON 2 MG/ML
4 INJECTION INTRAMUSCULAR; INTRAVENOUS ONCE
Status: COMPLETED | OUTPATIENT
Start: 2024-04-21 | End: 2024-04-21

## 2024-04-21 RX ORDER — BISACODYL 5 MG/1
5 TABLET, DELAYED RELEASE ORAL
COMMUNITY
Start: 2024-02-06

## 2024-04-21 RX ORDER — ACETAMINOPHEN AND CODEINE PHOSPHATE 120; 12 MG/5ML; MG/5ML
0.35 SOLUTION ORAL DAILY
COMMUNITY
Start: 2024-03-19

## 2024-04-21 RX ORDER — 0.9 % SODIUM CHLORIDE 0.9 %
1000 INTRAVENOUS SOLUTION INTRAVENOUS ONCE
Status: COMPLETED | OUTPATIENT
Start: 2024-04-21 | End: 2024-04-21

## 2024-04-21 RX ORDER — SUCRALFATE 1 G/1
1 TABLET ORAL 3 TIMES DAILY
COMMUNITY
Start: 2024-02-06

## 2024-04-21 RX ORDER — LOSARTAN POTASSIUM AND HYDROCHLOROTHIAZIDE 12.5; 1 MG/1; MG/1
1 TABLET ORAL DAILY
COMMUNITY
Start: 2024-03-04

## 2024-04-21 RX ORDER — KETOROLAC TROMETHAMINE 30 MG/ML
30 INJECTION, SOLUTION INTRAMUSCULAR; INTRAVENOUS ONCE
Status: COMPLETED | OUTPATIENT
Start: 2024-04-21 | End: 2024-04-21

## 2024-04-21 RX ORDER — PANTOPRAZOLE SODIUM 40 MG/1
40 TABLET, DELAYED RELEASE ORAL 2 TIMES DAILY
COMMUNITY
Start: 2024-04-10

## 2024-04-21 RX ORDER — AMLODIPINE BESYLATE 10 MG/1
1 TABLET ORAL EVERY MORNING
COMMUNITY
Start: 2024-04-15

## 2024-04-21 RX ORDER — MAGNESIUM HYDROXIDE/ALUMINUM HYDROXICE/SIMETHICONE 120; 1200; 1200 MG/30ML; MG/30ML; MG/30ML
30 SUSPENSION ORAL ONCE
Status: COMPLETED | OUTPATIENT
Start: 2024-04-21 | End: 2024-04-21

## 2024-04-21 RX ORDER — ACETAMINOPHEN AND CODEINE PHOSPHATE 120; 12 MG/5ML; MG/5ML
0.35 SOLUTION ORAL
COMMUNITY
Start: 2024-03-19

## 2024-04-21 RX ORDER — FAMOTIDINE 40 MG/1
40 TABLET, FILM COATED ORAL NIGHTLY PRN
COMMUNITY
Start: 2024-04-10

## 2024-04-21 RX ADMIN — SODIUM CHLORIDE 1000 ML: 9 INJECTION, SOLUTION INTRAVENOUS at 11:07

## 2024-04-21 RX ADMIN — ALUMINUM HYDROXIDE, MAGNESIUM HYDROXIDE, AND SIMETHICONE 30 ML: 200; 200; 20 SUSPENSION ORAL at 11:05

## 2024-04-21 RX ADMIN — KETOROLAC TROMETHAMINE 30 MG: 30 INJECTION, SOLUTION INTRAMUSCULAR; INTRAVENOUS at 11:05

## 2024-04-21 RX ADMIN — ONDANSETRON 4 MG: 2 INJECTION INTRAMUSCULAR; INTRAVENOUS at 11:06

## 2024-04-21 ASSESSMENT — PAIN DESCRIPTION - LOCATION: LOCATION: ABDOMEN

## 2024-04-21 ASSESSMENT — PAIN - FUNCTIONAL ASSESSMENT: PAIN_FUNCTIONAL_ASSESSMENT: 0-10

## 2024-04-21 ASSESSMENT — PAIN SCALES - GENERAL: PAINLEVEL_OUTOF10: 8

## 2024-04-21 NOTE — ED NOTES
Pt arrived to ED with report of chronic worsening RUQ abd pain  Pt states she had her gallbladder removed after having her baby.  Pt reports PMH of kidney stones which resulted in sepsis.   Pt A&Ox4, RR even/unlabored, roya llight within reach  
Pt back from ultrasound via stretcher   
The following labs were labeled with appropriate pt sticker and tubed to lab:     [] Blue     [] Lavender   [] on ice  [x] Green/yellow small tube   [] Green/black [] on ice  [] Grey  [] on ice  [] Yellow  [] Red  [] Pink  [] Type/ Screen  [] ABG  [] VBG    [] COVID-19 swab    [] Rapid  [] PCR  [] Flu swab  [] Peds Viral Panel     [] Urine Sample  [] Fecal Sample  [] Pelvic Cultures  [] Blood Cultures  [] X 2  [] STREP Cultures  [] Wound Cultures    
Unable to obtain sufficient blood specimen. Picc team consulted  
FEVER

## 2024-04-21 NOTE — ED PROVIDER NOTES
[facility] ED  Emergency Department Encounter  Emergency Medicine Resident     Pt Name:Aliza Holman  MRN: [id number]  Birthdate 1985  Date of evaluation: 4/21/24  PCP:  Eve Sifuentes  Note Started: 10:16 AM EDT      CHIEF COMPLAINT       Chief Complaint   Patient presents with    Abdominal Pain     RUQ       HISTORY OF PRESENT ILLNESS  (Location/Symptom, Timing/Onset, Context/Setting, Quality, Duration, Modifying Factors, Severity.)      Aliza Holman is a 38 y.o. female who presents with right upper quadrant abdominal pain.  Patient does have a history of cholecystectomy 5 years ago after experiencing cholelithiasis during pregnancy.  States that she was admitted to the hospital late last year for urosepsis.  States that ever since she had urosepsis she has been experiencing right upper quadrant abdominal pain.  Recently got an EGD and colonoscopy and has been following up with GI.  Patient states that her EGD showed ulcerations.  Is on Carafate, Protonix, Pepcid.  States that these medications have resolved her stomach issues but states that she has had persistent right upper quadrant pain for the past few months.  Endorsing slight nausea.  Slight back pain beneath the right shoulder blade.  No rectal bleeding.  However states that her stools are pale in nature.  No vaginal discharge or bleeding.  No dysuria or hematuria.  States he does not feel like her kidney stones she is experienced in the past.    PAST MEDICAL / SURGICAL / SOCIAL / FAMILY HISTORY      has a past medical history of Abnormal Pap smear of cervix, Blood in urine, Difficult intravenous access, Fall, Flank pain, Gall stones, GERD (gastroesophageal reflux disease), Heart palpitations, Hematuria, Hypertension, Kidney stones, PONV (postoperative nausea and vomiting), Sensation of pressure in bladder area, Ureteral stent retained, Urinary incontinence, Urinary leakage, Urinary urgency, Vitamin D deficiency, and Wears

## 2024-04-21 NOTE — DISCHARGE INSTRUCTIONS
You were evaluated the emergency department for abdominal pain.  Your lab work and imaging did not reveal any acute abnormalities.  Please follow-up with your primary care physician and GI doctor.  Please continue taking your medications as prescribed.  Please return to the emergency department for any worsening symptoms, questions or concerns

## 2024-04-21 NOTE — ED PROVIDER NOTES
[facility] ED     Emergency Department     Faculty Attestation    I performed a history and physical examination of the patient and discussed management with the resident. I reviewed the resident’s note and agree with the documented findings and plan of care. Any areas of disagreement are noted on the chart. I was personally present for the key portions of any procedures. I have documented in the chart those procedures where I was not present during the key portions. I have reviewed the emergency nurses triage note. I agree with the chief complaint, past medical history, past surgical history, allergies, medications, social and family history as documented unless otherwise noted below. For Physician Assistant/ Nurse Practitioner cases/documentation I have personally evaluated this patient and have completed at least one if not all key elements of the E/M (history, physical exam, and MDM). Additional findings are as noted.    Note Started: 10:59 AM EDT    Patient here with abdominal pain.  Primarily right upper quadrant.  Did have her gallbladder years ago but states this feels similar to that.  Nausea as well.  Denies history of pancreatitis but does mention she is concerned about that.  No fevers with this.  On exam appears uncomfortable but nontoxic.  Focal epigastric right upper tenderness but no rebound no guarding no Fofana sign.  Will review chart as she states she had recent scopes with GI and has improved on meds since then with Protonix, labs pregnancy test reevaluate need for imaging    Critical Care     none    Manuel Rangel MD, FACEP, FAAEM  Attending Emergency  Physician           Manuel Rangel MD  04/21/24 [id number]

## 2024-04-29 ENCOUNTER — TELEPHONE (OUTPATIENT)
Dept: GASTROENTEROLOGY | Age: 39
End: 2024-04-29

## 2024-04-29 NOTE — TELEPHONE ENCOUNTER
Writer called and left voice mail message for patient to call back and make a appointment from her referral for ollie.

## 2024-05-08 ENCOUNTER — HOSPITAL ENCOUNTER (EMERGENCY)
Age: 39
Discharge: HOME | End: 2024-05-08
Attending: EMERGENCY MEDICINE
Payer: MEDICAID

## 2024-05-08 ENCOUNTER — APPOINTMENT (OUTPATIENT)
Dept: GENERAL RADIOLOGY | Age: 39
End: 2024-05-08
Payer: MEDICAID

## 2024-05-08 ENCOUNTER — APPOINTMENT (OUTPATIENT)
Dept: CT IMAGING | Age: 39
End: 2024-05-08
Payer: MEDICAID

## 2024-05-08 VITALS
RESPIRATION RATE: 20 BRPM | SYSTOLIC BLOOD PRESSURE: 130 MMHG | TEMPERATURE: 98.4 F | DIASTOLIC BLOOD PRESSURE: 82 MMHG | HEART RATE: 91 BPM | OXYGEN SATURATION: 100 %

## 2024-05-08 DIAGNOSIS — R07.9 CHEST PAIN, UNSPECIFIED TYPE: Primary | ICD-10-CM

## 2024-05-08 LAB
ANION GAP SERPL CALCULATED.3IONS-SCNC: 13 MMOL/L (ref 9–16)
BASOPHILS # BLD: <0.03 K/UL (ref 0–0.2)
BASOPHILS NFR BLD: 0 % (ref 0–2)
BUN SERPL-MCNC: 13 MG/DL (ref 6–20)
CALCIUM SERPL-MCNC: 9.5 MG/DL (ref 8.6–10.4)
CHLORIDE SERPL-SCNC: 100 MMOL/L (ref 98–107)
CO2 SERPL-SCNC: 23 MMOL/L (ref 20–31)
CREAT SERPL-MCNC: 1.1 MG/DL (ref 0.5–0.9)
D DIMER PPP FEU-MCNC: 0.63 UG/ML FEU (ref 0–0.57)
EOSINOPHIL # BLD: 0.04 K/UL (ref 0–0.44)
EOSINOPHILS RELATIVE PERCENT: 1 % (ref 1–4)
ERYTHROCYTE [DISTWIDTH] IN BLOOD BY AUTOMATED COUNT: 13.2 % (ref 11.8–14.4)
GFR, ESTIMATED: 70 ML/MIN/1.73M2
GLUCOSE SERPL-MCNC: 89 MG/DL (ref 74–99)
HCG SERPL QL: NEGATIVE
HCT VFR BLD AUTO: 41.2 % (ref 36.3–47.1)
HGB BLD-MCNC: 13.2 G/DL (ref 11.9–15.1)
IMM GRANULOCYTES # BLD AUTO: <0.03 K/UL (ref 0–0.3)
IMM GRANULOCYTES NFR BLD: 0 %
LYMPHOCYTES NFR BLD: 2.8 K/UL (ref 1.1–3.7)
LYMPHOCYTES RELATIVE PERCENT: 43 % (ref 24–43)
MCH RBC QN AUTO: 28.6 PG (ref 25.2–33.5)
MCHC RBC AUTO-ENTMCNC: 32 G/DL (ref 28.4–34.8)
MCV RBC AUTO: 89.4 FL (ref 82.6–102.9)
MONOCYTES NFR BLD: 0.5 K/UL (ref 0.1–1.2)
MONOCYTES NFR BLD: 8 % (ref 3–12)
NEUTROPHILS NFR BLD: 48 % (ref 36–65)
NEUTS SEG NFR BLD: 3.1 K/UL (ref 1.5–8.1)
NRBC BLD-RTO: 0 PER 100 WBC
PLATELET # BLD AUTO: 286 K/UL (ref 138–453)
PMV BLD AUTO: 8.4 FL (ref 8.1–13.5)
POTASSIUM SERPL-SCNC: 3.7 MMOL/L (ref 3.7–5.3)
RBC # BLD AUTO: 4.61 M/UL (ref 3.95–5.11)
SODIUM SERPL-SCNC: 136 MMOL/L (ref 136–145)
TROPONIN I SERPL HS-MCNC: <6 NG/L (ref 0–14)
WBC OTHER # BLD: 6.5 K/UL (ref 3.5–11.3)

## 2024-05-08 PROCEDURE — 84703 CHORIONIC GONADOTROPIN ASSAY: CPT

## 2024-05-08 PROCEDURE — 93005 ELECTROCARDIOGRAM TRACING: CPT | Performed by: EMERGENCY MEDICINE

## 2024-05-08 PROCEDURE — 99285 EMERGENCY DEPT VISIT HI MDM: CPT

## 2024-05-08 PROCEDURE — 71045 X-RAY EXAM CHEST 1 VIEW: CPT

## 2024-05-08 PROCEDURE — 85025 COMPLETE CBC W/AUTO DIFF WBC: CPT

## 2024-05-08 PROCEDURE — 84484 ASSAY OF TROPONIN QUANT: CPT

## 2024-05-08 PROCEDURE — 85379 FIBRIN DEGRADATION QUANT: CPT

## 2024-05-08 PROCEDURE — 71260 CT THORAX DX C+: CPT

## 2024-05-08 PROCEDURE — 80048 BASIC METABOLIC PNL TOTAL CA: CPT

## 2024-05-08 PROCEDURE — 6360000004 HC RX CONTRAST MEDICATION: Performed by: EMERGENCY MEDICINE

## 2024-05-08 RX ORDER — NAPROXEN 500 MG/1
500 TABLET ORAL 2 TIMES DAILY WITH MEALS
Qty: 20 TABLET | Refills: 0 | Status: SHIPPED | OUTPATIENT
Start: 2024-05-08 | End: 2024-05-23

## 2024-05-08 RX ADMIN — IOPAMIDOL 75 ML: 755 INJECTION, SOLUTION INTRAVENOUS at 09:54

## 2024-05-08 ASSESSMENT — ENCOUNTER SYMPTOMS
BACK PAIN: 0
ABDOMINAL PAIN: 0
COUGH: 0
WHEEZING: 0
DIARRHEA: 0
SHORTNESS OF BREATH: 0
NAUSEA: 0
ORTHOPNEA: 0
VOMITING: 0

## 2024-05-08 ASSESSMENT — PAIN SCALES - GENERAL: PAINLEVEL_OUTOF10: 1

## 2024-05-08 ASSESSMENT — PAIN - FUNCTIONAL ASSESSMENT: PAIN_FUNCTIONAL_ASSESSMENT: 0-10

## 2024-05-08 ASSESSMENT — PAIN DESCRIPTION - LOCATION: LOCATION: CHEST

## 2024-05-08 NOTE — ED PROVIDER NOTES
[facility] ED  EMERGENCY DEPARTMENT ENCOUNTER      Pt Name: Aliza Holman  MRN: [id number]  Birthdate 1985  Date of evaluation: 5/8/24  PCP:  Eve Sifuentes APRN - NP    CHIEF COMPLAINT:   Chief Complaint   Patient presents with    Chest Pain     HISTORY OF PRESENT ILLNESS   Aliza Holman is a 38 y.o. female whopresents with chest pain.  Patient has a multiday history of chest pain.  She states sometimes she gets chest pain at times with pain in her shoulders and arms that feels achy.  She states the symptoms happen randomly or not [employer] worse with exertion.  There is no cough shortness of breath or infectious symptoms.  She has a history of hypertension and been compliant with her medications.  She denies any abdominal pain nausea vomiting diarrhea.  The her family member history of heart problems but majority of them were diagnosed in their 70s and 80s is no family history of any cardiac disease at a young age and 30s or 40s.        REVIEW OF SYSTEMS       Review of Systems   Constitutional:  Negative for chills and fever.   HENT:  Negative for nosebleeds.    Respiratory:  Negative for cough, shortness of breath and wheezing.    Cardiovascular:  Positive for chest pain. Negative for palpitations and orthopnea.   Gastrointestinal:  Negative for abdominal pain, diarrhea, nausea and vomiting.   Genitourinary:  Negative for dysuria and urgency.   Musculoskeletal:  Negative for back pain and neck pain.   Skin:  Negative for rash.   Neurological:  Negative for dizziness, loss of consciousness and headaches.       10 essential systems negative except as stated above and in the HPI.      PAST MEDICAL HISTORY   PMH:  has a past medical history of Abnormal Pap smear of cervix, Blood in urine, Difficult intravenous access, Fall, Flank pain, Gall stones, GERD (gastroesophageal reflux disease), Heart palpitations, Hematuria, Hypertension, Kidney stones, PONV (postoperative nausea and vomiting),

## 2024-05-08 NOTE — ED TRIAGE NOTES
Pt to ed c/o chest pain. Pt states the pain radiates to bilateral arms and back. Pt states the pain is on the left side of her chest. Per pt the pain started yesterday and has been intermittent. Pt has hx of hypertension and is taking meds as prescribed. Pt states she has gerd symptoms and has been taking her meds for it.    Pt is alert and oriented x4. Ambulatory to room. Pt in gown, on full cardiac monitor. EKG done. Iv placed, labs drawn. Call light in reach. Will continue with plan of care.

## 2024-05-10 LAB
EKG ATRIAL RATE: 83 BPM
EKG P AXIS: 30 DEGREES
EKG P-R INTERVAL: 150 MS
EKG Q-T INTERVAL: 360 MS
EKG QRS DURATION: 88 MS
EKG QTC CALCULATION (BAZETT): 423 MS
EKG R AXIS: 23 DEGREES
EKG T AXIS: 44 DEGREES
EKG VENTRICULAR RATE: 83 BPM

## (undated) DEVICE — GLOVE ORANGE PI 8 1/2   MSG9085

## (undated) DEVICE — MAT FLOOR ULTRA ABS 28X48IN

## (undated) DEVICE — DRAPE,REIN 53X77,STERILE: Brand: MEDLINE

## (undated) DEVICE — GLOVE ORANGE PI 8   MSG9080

## (undated) DEVICE — PROBE LITHO L570MM DIA1MM PNEUMAT SWISS LITHOCLAST

## (undated) DEVICE — PROBE LITHO L403MM DIA3.8MM US SWISS LITHOCLAST

## (undated) DEVICE — Z DISCONTINUED BY MEDLINE USE 2711682 TRAY SKIN PREP DRY W/ PREM GLV

## (undated) DEVICE — SOLUTION SCRB 4OZ 4% CHG H2O AIDED FOR PREOPERATIVE SKIN

## (undated) DEVICE — TUBING, SUCTION, 9/32" X 20', STRAIGHT: Brand: MEDLINE INDUSTRIES, INC.

## (undated) DEVICE — GARMENT,MEDLINE,DVT,INT,CALF,MED, GEN2: Brand: MEDLINE

## (undated) DEVICE — KENDALL SCD EXPRESS SLEEVES, KNEE LENGTH, MEDIUM: Brand: KENDALL SCD

## (undated) DEVICE — GAUZE,SPONGE,FLUFF,6"X6.75",STRL,5/TRAY: Brand: MEDLINE

## (undated) DEVICE — LIEBERMAN INTRODUCER: Brand: LIEBERMAN

## (undated) DEVICE — SHEET DRAPE FULL 70X100

## (undated) DEVICE — Y-TYPE TUR/BLADDER IRRIGATION SET, REGULATING CLAMP

## (undated) DEVICE — CRANIOTOMY DRAPE, STERILE: Brand: MEDLINE

## (undated) DEVICE — GLOVE ORANGE PI 7   MSG9070

## (undated) DEVICE — DECANTER FLD 9IN ST BG FOR ASEP TRNSF OF FLD

## (undated) DEVICE — DRAINBAG,ANTI-REFLUX TOWER,L/F,2000ML,LL: Brand: MEDLINE

## (undated) DEVICE — 60 ML SYRINGE LUER-LOCK TIP: Brand: MONOJECT

## (undated) DEVICE — DRAPE C ARM UNIV W41XL74IN CLR PLAS XR VELC CLSR POLY STRP

## (undated) DEVICE — 3M™ IOBAN™ 2 ANTIMICROBIAL INCISE DRAPE 6650EZ: Brand: IOBAN™ 2

## (undated) DEVICE — CONTAINER,SPECIMEN,4OZ,OR STRL: Brand: MEDLINE

## (undated) DEVICE — 3M™ STERI-STRIP™ COMPOUND BENZOIN TINCTURE 40 BAGS/CARTON 4 CARTONS/CASE C1544: Brand: 3M™ STERI-STRIP™

## (undated) DEVICE — ADAPTER URO SCP UROLOK LL

## (undated) DEVICE — GOWN,AURORA,NONRNF,XL,30/CS: Brand: MEDLINE

## (undated) DEVICE — NITINOL STONE RETRIEVAL BASKET: Brand: ZERO TIP

## (undated) DEVICE — GUIDEWIRE URO L150CM DIA0.035IN STIFF NIT HYDRPHLC STR TIP

## (undated) DEVICE — PACK PROCEDURE SURG CYSTO SVMMC LF

## (undated) DEVICE — CATCHER STONE TBNG ADPT SWISS LITHOCLAST

## (undated) DEVICE — GLOVE ORANGE PI 7 1/2   MSG9075

## (undated) DEVICE — OCCLUSION BALLOON CATHETER: Brand: OCCLUDER

## (undated) DEVICE — HIGH PRESSURE NEPHROSTOMY BALLOON CATHETER KIT: Brand: NEPHROMAX KIT

## (undated) DEVICE — FIBER LSR DIA200UM FLEXSHIELD COAT HOLM HI PWR TRAC TIP

## (undated) DEVICE — SVMMC PEDS/UROLOGY MINOR PACK: Brand: MEDLINE INDUSTRIES, INC.

## (undated) DEVICE — STRIP,CLOSURE,WOUND,MEDI-STRIP,1/2X4: Brand: MEDLINE

## (undated) DEVICE — SINGLE ACTION PUMPING SYSTEM